# Patient Record
Sex: FEMALE | Race: WHITE | NOT HISPANIC OR LATINO
[De-identification: names, ages, dates, MRNs, and addresses within clinical notes are randomized per-mention and may not be internally consistent; named-entity substitution may affect disease eponyms.]

---

## 2019-01-08 ENCOUNTER — APPOINTMENT (OUTPATIENT)
Dept: NEPHROLOGY | Facility: CLINIC | Age: 69
End: 2019-01-08

## 2019-02-11 ENCOUNTER — LABORATORY RESULT (OUTPATIENT)
Age: 69
End: 2019-02-11

## 2019-02-11 ENCOUNTER — APPOINTMENT (OUTPATIENT)
Dept: NEPHROLOGY | Facility: CLINIC | Age: 69
End: 2019-02-11
Payer: MEDICARE

## 2019-02-11 VITALS
SYSTOLIC BLOOD PRESSURE: 142 MMHG | WEIGHT: 128 LBS | HEIGHT: 67 IN | HEART RATE: 81 BPM | DIASTOLIC BLOOD PRESSURE: 91 MMHG | OXYGEN SATURATION: 97 % | BODY MASS INDEX: 20.09 KG/M2

## 2019-02-11 VITALS — SYSTOLIC BLOOD PRESSURE: 128 MMHG | DIASTOLIC BLOOD PRESSURE: 78 MMHG

## 2019-02-11 DIAGNOSIS — Z87.891 PERSONAL HISTORY OF NICOTINE DEPENDENCE: ICD-10-CM

## 2019-02-11 DIAGNOSIS — Z78.9 OTHER SPECIFIED HEALTH STATUS: ICD-10-CM

## 2019-02-11 DIAGNOSIS — Z86.73 PERSONAL HISTORY OF TRANSIENT ISCHEMIC ATTACK (TIA), AND CEREBRAL INFARCTION W/OUT RESIDUAL DEFICITS: ICD-10-CM

## 2019-02-11 DIAGNOSIS — Z82.49 FAMILY HISTORY OF ISCHEMIC HEART DISEASE AND OTHER DISEASES OF THE CIRCULATORY SYSTEM: ICD-10-CM
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
## 2019-02-11 DIAGNOSIS — Z80.1 FAMILY HISTORY OF MALIGNANT NEOPLASM OF TRACHEA, BRONCHUS AND LUNG: ICD-10-CM

## 2019-02-11 DIAGNOSIS — Z86.59 PERSONAL HISTORY OF OTHER MENTAL AND BEHAVIORAL DISORDERS: ICD-10-CM

## 2019-02-11 PROCEDURE — 99205 OFFICE O/P NEW HI 60 MIN: CPT

## 2019-02-12 LAB
ALBUMIN SERPL ELPH-MCNC: 4 G/DL
ALP BLD-CCNC: 47 U/L
ALT SERPL-CCNC: 10 U/L
ANION GAP SERPL CALC-SCNC: 11 MMOL/L
APPEARANCE: CLEAR
AST SERPL-CCNC: 12 U/L
BACTERIA: NEGATIVE
BASOPHILS # BLD AUTO: 0.03 K/UL
BASOPHILS NFR BLD AUTO: 0.3 %
BILIRUB SERPL-MCNC: 0.2 MG/DL
BILIRUBIN URINE: NEGATIVE
BLOOD URINE: ABNORMAL
BUN SERPL-MCNC: 35 MG/DL
CALCIUM SERPL-MCNC: 9.4 MG/DL
CHLORIDE SERPL-SCNC: 102 MMOL/L
CO2 SERPL-SCNC: 23 MMOL/L
COLOR: YELLOW
CREAT SERPL-MCNC: 1.73 MG/DL
CREAT SPEC-SCNC: 37 MG/DL
CREAT/PROT UR: 0.4 RATIO
EOSINOPHIL # BLD AUTO: 0.18 K/UL
EOSINOPHIL NFR BLD AUTO: 1.8 %
GLUCOSE QUALITATIVE U: NEGATIVE MG/DL
GLUCOSE SERPL-MCNC: 95 MG/DL
HBA1C MFR BLD HPLC: 5.4 %
HCT VFR BLD CALC: 41.9 %
HGB BLD-MCNC: 13.1 G/DL
HYALINE CASTS: 1 /LPF
IMM GRANULOCYTES NFR BLD AUTO: 0.2 %
KETONES URINE: NEGATIVE
LEUKOCYTE ESTERASE URINE: NEGATIVE
LYMPHOCYTES # BLD AUTO: 2.53 K/UL
LYMPHOCYTES NFR BLD AUTO: 25.4 %
MAN DIFF?: NORMAL
MCHC RBC-ENTMCNC: 29.7 PG
MCHC RBC-ENTMCNC: 31.3 GM/DL
MCV RBC AUTO: 95 FL
MICROSCOPIC-UA: NORMAL
MONOCYTES # BLD AUTO: 0.84 K/UL
MONOCYTES NFR BLD AUTO: 8.4 %
NEUTROPHILS # BLD AUTO: 6.38 K/UL
NEUTROPHILS NFR BLD AUTO: 63.9 %
NITRITE URINE: NEGATIVE
PH URINE: 5.5
PLATELET # BLD AUTO: 307 K/UL
POTASSIUM SERPL-SCNC: 5.2 MMOL/L
PROT SERPL-MCNC: 7 G/DL
PROT UR-MCNC: 16 MG/DL
PROTEIN URINE: NEGATIVE MG/DL
RBC # BLD: 4.41 M/UL
RBC # FLD: 12.8 %
RED BLOOD CELLS URINE: 8 /HPF
SODIUM SERPL-SCNC: 136 MMOL/L
SPECIFIC GRAVITY URINE: 1.01
SQUAMOUS EPITHELIAL CELLS: 1 /HPF
UROBILINOGEN URINE: NEGATIVE MG/DL
WBC # FLD AUTO: 9.98 K/UL
WHITE BLOOD CELLS URINE: 0 /HPF

## 2019-02-12 NOTE — CONSULT LETTER
[Dear  ___] : Dear  [unfilled], [Consult Letter:] : I had the pleasure of evaluating your patient, [unfilled]. [Consult Closing:] : Thank you very much for allowing me to participate in the care of this patient.  If you have any questions, please do not hesitate to contact me. [Sincerely,] : Sincerely, [FreeTextEntry1] : I have included my initial consult note for your review.  Although is likely that the patient chronic kidney disease is related to the history of severe eating disorder and the long history of NSAIDS intake, I have elected to check for laboratory abnormalities associated w/ glomerular disease.  I did not repeat her VIJAY, complement and ANCA as they were done recently and were negative.  IgA nephritis and membranous perhaps associated w/ antiphospholipid antibodies (the patient has history of at TIA) are possible but not likely.  If glomerular disease is suggested by the work up or nephrotic range proteinuria, I will discuss with Mrs. Montgomery the possibility of a renal biopsy.\par I will keep you informed of the progress on this patient's work up.\par Addendum: her work up shows minimal proteinuria and no evidence of glomerular disease.  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  I am concerned about the degree of microscopic hematuria.  As both analgesic associated nephropathy and smoking increase the risk of urothelial malignancy, I think the patient should see a urologist for cystoscopy and urine cytology.  I will contact your office shortly to discuss the urology referral with you.

## 2019-02-12 NOTE — HISTORY OF PRESENT ILLNESS
[FreeTextEntry1] : Mrs. Coughlin is a 67 yo woman referred for evaluation of proteinuria and a serum creatinine ot 1.49 reflecting advance stage III CKD.  Review of the available data shows a renal ultrasound demonstrating two kidneys of normal size w/ small cysts and fullness of the renal pelvis without evidence of obstruction.  No mention of increased cortical echogenicity.  Normal post voiding residual urine in the bladder.  Laboratory studies show a potassium of 5.9, a fasting glucose of 116, normal complement and negative VIJAY and ANCA (Myeloperox and Protein S3). Non nephrotic proteinuria is demonstrated by a microalbumin to creatinine of over 900. \par Mrs. Coughlin is referred by the PA in her PCP office (Dr. Mason) for evaluation of high creatinine, proteinuria and microscopic hematuria.  She states that since 2015 she has been aware of an elevated creatinine.  It was 1.3 then. In 2018 her creatinine has fluctuated from 1.49 and 1.7.  She has seen a nephrologist, Dr. Hill several months ago.  Changes were made in her diet and discontinuation of supplements including herbs and vitamins.  She also stopped using NSAIDS.  She has been using Advil for over 20 years because of chronic low back pain.  She was also on PPI for chronic reflux, and they were discontinued. She has seen a pain consultant and was given a card for medical marijuana.  She was also given a Botox injection for lower back spasms.  Of note, the patient states that when her bulimia/anorexia were active, her weight was down to 90 lbs and she was closed to be hospitalized. Her weight has been stable to 130 lbs for many years.  The patient has no family history of kidney disease.  Her previous nephrologist attributed the chronic kidney disease to her past history of eating disorder.  \par Recently the patient was told that her serum K was elevated.  She was given an outpatient dose of Kayexalate two days ago. She has history of "arthritis" which she is not sure if it is Rheumatoid or Degenerative.  She states that her arthritis is mostly in her spine w/ several herniated discs at the cervical and lumbar level.

## 2019-02-12 NOTE — PHYSICAL EXAM
[General Appearance - Alert] : alert [Sclera] : the sclera and conjunctiva were normal [Outer Ear] : the ears and nose were normal in appearance [Neck Cervical Mass (___cm)] : no neck mass was observed [Jugular Venous Distention Increased] : there was no jugular-venous distention [Thyroid Diffuse Enlargement] : the thyroid was not enlarged [Auscultation Breath Sounds / Voice Sounds] : lungs were clear to auscultation bilaterally [Heart Sounds] : normal S1 and S2 [Heart Sounds Gallop] : no gallops [Systolic grade ___/6] : A grade [unfilled]/6 systolic murmur was heard. [Full Pulse] : the pedal pulses are present [Edema] : there was no peripheral edema [Veins - Varicosity Changes] : there were no varicosital changes [] : no hepato-splenomegaly [Urinary Bladder Findings] : the bladder was normal on palpation [Cervical Lymph Nodes Enlarged Posterior Bilaterally] : posterior cervical [Cervical Lymph Nodes Enlarged Anterior Bilaterally] : anterior cervical [No CVA Tenderness] : no ~M costovertebral angle tenderness [Abnormal Walk] : normal gait [Musculoskeletal - Swelling] : no joint swelling seen [No Focal Deficits] : no focal deficits [Oriented To Time, Place, And Person] : oriented to person, place, and time [FreeTextEntry1] : Normal wrist range of motion, No features of rheumatoid arthritis.

## 2019-02-12 NOTE — ASSESSMENT
[FreeTextEntry1] : 1. CKD stage III with proteinuria and hematuria.  It is likely that her proteinuria is not in the nephrotic range.  Her sonogram is normal except for few small cysts.  No evidence of ADPKD.  Her VIJAY, complement and ANCA are negative.  Her CKD may be related to the previous history of severe eating disorder.  It is not clear at this point if this is related to chronic glomerular disease or chronic interstitial disease related to episodes of FLORA.  Work up will include SPEP, UPEP, PLA2R Ab, antiphospholipid antibodies (history of TIA), repeat labs, including electrolytes (history of hyperkalemia) and renal function.  I discussed w/ the patient the possibility of a diagnostic renal biopsy if there is strong suspicion of a glomerular disorder.  Ig A is a possibility. \par 2. Hyperkalemia.  This is consistent with Type IV RTA.  There is no obstruction and no evidence of SLE.  Will repeat serum K and have discussed w/ the patient a low potassium diet. \par 3. Hypertension: currently BP is within guidelines of <130/80.\par 4. Hematuria and proteinuria.  UPEP requested.  \par The patient was given an appointment in 3 weeks.  She will be notified of the results of the work up later this week.

## 2019-02-12 NOTE — REVIEW OF SYSTEMS
[Feeling Tired] : feeling tired [Diarrhea] : diarrhea [Arthralgias] : arthralgias [Joint Pain] : joint pain [As Noted in HPI] : as noted in HPI [Negative] : Integumentary [Fever] : no fever [Joint Swelling] : no joint swelling [FreeTextEntry5] : H [de-identified] : History of a TIA

## 2019-02-13 LAB
ALBUMIN MFR SERPL ELPH: 58 %
ALBUMIN SERPL-MCNC: 4.1 G/DL
ALBUMIN/GLOB SERPL: 1.4 RATIO
ALPHA1 GLOB MFR SERPL ELPH: 4.5 %
ALPHA1 GLOB SERPL ELPH-MCNC: 0.3 G/DL
ALPHA2 GLOB MFR SERPL ELPH: 11.8 %
ALPHA2 GLOB SERPL ELPH-MCNC: 0.8 G/DL
B-GLOBULIN MFR SERPL ELPH: 10.5 %
B-GLOBULIN SERPL ELPH-MCNC: 0.7 G/DL
GAMMA GLOB FLD ELPH-MCNC: 1.1 G/DL
GAMMA GLOB MFR SERPL ELPH: 15.2 %
INTERPRETATION SERPL IEP-IMP: NORMAL
PROT SERPL-MCNC: 7 G/DL
PROT SERPL-MCNC: 7 G/DL

## 2019-02-14 LAB — DEPRECATED CARDIOLIPIN IGA SER: <5 APL

## 2019-02-16 LAB
PHOSPHOLIPASE A2 RECEPTOR ELISA: <2 RU/ML
PHOSPHOLIPASE A2 RECEPTOR IFA: NEGATIVE

## 2019-03-04 ENCOUNTER — APPOINTMENT (OUTPATIENT)
Dept: NEPHROLOGY | Facility: CLINIC | Age: 69
End: 2019-03-04
Payer: MEDICARE

## 2019-03-04 VITALS
HEIGHT: 67 IN | OXYGEN SATURATION: 98 % | SYSTOLIC BLOOD PRESSURE: 150 MMHG | DIASTOLIC BLOOD PRESSURE: 90 MMHG | HEART RATE: 71 BPM | BODY MASS INDEX: 20.4 KG/M2 | WEIGHT: 130 LBS

## 2019-03-04 PROCEDURE — 99214 OFFICE O/P EST MOD 30 MIN: CPT

## 2019-03-05 ENCOUNTER — OTHER (OUTPATIENT)
Age: 69
End: 2019-03-05

## 2019-03-05 LAB
ALBUMIN SERPL ELPH-MCNC: 4.5 G/DL
ANION GAP SERPL CALC-SCNC: 12 MMOL/L
BUN SERPL-MCNC: 28 MG/DL
CALCIUM SERPL-MCNC: 9.5 MG/DL
CHLORIDE SERPL-SCNC: 101 MMOL/L
CO2 SERPL-SCNC: 23 MMOL/L
CREAT SERPL-MCNC: 1.53 MG/DL
GLUCOSE SERPL-MCNC: 83 MG/DL
PHOSPHATE SERPL-MCNC: 4.4 MG/DL
POTASSIUM SERPL-SCNC: 5.8 MMOL/L
SODIUM SERPL-SCNC: 136 MMOL/L

## 2019-03-05 NOTE — PHYSICAL EXAM
[General Appearance - Alert] : alert [Sclera] : the sclera and conjunctiva were normal [Outer Ear] : the ears and nose were normal in appearance [Neck Cervical Mass (___cm)] : no neck mass was observed [Jugular Venous Distention Increased] : there was no jugular-venous distention [Thyroid Diffuse Enlargement] : the thyroid was not enlarged [Auscultation Breath Sounds / Voice Sounds] : lungs were clear to auscultation bilaterally [Heart Sounds] : normal S1 and S2 [Heart Sounds Gallop] : no gallops [Systolic grade ___/6] : A grade [unfilled]/6 systolic murmur was heard. [Full Pulse] : the pedal pulses are present [Edema] : there was no peripheral edema [Veins - Varicosity Changes] : there were no varicosital changes [] : no hepato-splenomegaly [Urinary Bladder Findings] : the bladder was normal on palpation [Cervical Lymph Nodes Enlarged Posterior Bilaterally] : posterior cervical [Cervical Lymph Nodes Enlarged Anterior Bilaterally] : anterior cervical [No CVA Tenderness] : no ~M costovertebral angle tenderness [Abnormal Walk] : normal gait [Musculoskeletal - Swelling] : no joint swelling seen [FreeTextEntry1] : Normal wrist range of motion, No features of rheumatoid arthritis. [No Focal Deficits] : no focal deficits [Oriented To Time, Place, And Person] : oriented to person, place, and time

## 2019-03-05 NOTE — ASSESSMENT
[FreeTextEntry1] : 1. CKD stage 3A1 (eGFR between 45 and 60 and minimal albuminuria).  Likely related to Chronic Interstitial Nephritis from use of analgesics and history of anorexia/bulimia.\par 2. Hyperkalemia out of proportion of renal dysfunction.  Compatible with distal. tubular dysfuncrion causing a defect in potassium secretion. Will check her renal panel today.\par 3. Proteinuria: no significant albuminuria.  No evidence of glomerular disease.\par I have discussed with the patient and her sister that her clinical picture is compatible with chronic interstitial nephritis secondary to tubular injury from analgesics and bulimia.  There is no evidence of a systemic disease, paraprotein associated disease, membranous or obstruction.  We discussed that patient with stage 3A1 CKD that has been stable have a low rate of progression to ESKD.  We also discussed that a kidney biopsy may be of some diagnostic value but it will likely not change the treatment.  We also discussed that if her kidney function worsens in the next several months, a kidney biopsy may become necessary to uncover a progressive renal disease.  We finally discussed the approach to hyperkalemia.  The patient is following a low K diet.  If her repeat K is elevated she is a candidate for Velatassa or Lokelma therapy, starting with once a day dose.  The patient and her sister have an excellent understanding of the above (as demonstrated by the "teach back"). \par I will call the patient in the morning to discuss the next steps.

## 2019-03-05 NOTE — ADDENDUM
[FreeTextEntry1] : March 5th, 2019 Addendum.  Creatinine down to 1.56.  K is high at 5.8. The patient will come to the office today at 2:30 for discussion and initiation of Lokelma therapy once a day with Dr. Doty.

## 2019-03-05 NOTE — CONSULT LETTER
[Dear  ___] : Dear  [unfilled], [Consult Letter:] : I had the pleasure of evaluating your patient, [unfilled]. [Sincerely,] : Sincerely, [FreeTextEntry1] : I have included my note for your review. The clinical picture best fits the diagnosis of chronic interstitial nephritis from analgesics and the previous history of bulimia/anorexia.  Damage to the potassium secretory mechanism of the distal tubule is responsible for the patient's hyperkalemia. She will start Lokelma once a day (sodium zirconium cyclosilcate oral suspension) once a day and repeat a renal panel in two weeks.\par Her current kidney disease is stage 3A1 (eGFR between 60 and 45 and no or minimal albuminuria).  Progression of ESKD is unlikely for the next decade.  \par Mrs. Coughlin will have her creatinine and potassium monitored at 2 to 3 months interval. \par As mentioned in my previous note, she has significant microscopic hematuria. This is a chronic findings and the patient had negative cystoscopies in the past.  However, both smoking and analgesic use are associate w/ an increased risk of transitional cell neoplasia.  She is seeing a urologist, as arranged by your PA next week.\par Thank you again for referring Mrs. Coughlin and feel free to contact me with questions or comments.  [FreeTextEntry3] : Conor Fitzgerald MD

## 2019-03-05 NOTE — REVIEW OF SYSTEMS
[Feeling Tired] : feeling tired [Diarrhea] : diarrhea [Dysuria] : dysuria [Arthralgias] : arthralgias [Negative] : Psychiatric

## 2019-03-05 NOTE — HISTORY OF PRESENT ILLNESS
[FreeTextEntry1] : Mrs. Coughlin is returning for follow up and discussion of the work up for the cause of her CKD III A1. Since her last visit there has been no changes in her status. She has made an appointment to see a urologist for microscopic hematuria in the setting of an increased risk for TCC because of her history of smoking and analgesic use.

## 2019-05-20 ENCOUNTER — APPOINTMENT (OUTPATIENT)
Dept: NEPHROLOGY | Facility: CLINIC | Age: 69
End: 2019-05-20
Payer: MEDICARE

## 2019-05-20 VITALS
OXYGEN SATURATION: 99 % | HEIGHT: 67 IN | HEART RATE: 77 BPM | SYSTOLIC BLOOD PRESSURE: 176 MMHG | DIASTOLIC BLOOD PRESSURE: 106 MMHG | WEIGHT: 130 LBS | BODY MASS INDEX: 20.4 KG/M2

## 2019-05-20 VITALS — DIASTOLIC BLOOD PRESSURE: 92 MMHG | SYSTOLIC BLOOD PRESSURE: 150 MMHG

## 2019-05-20 PROCEDURE — 99214 OFFICE O/P EST MOD 30 MIN: CPT

## 2019-05-20 NOTE — HISTORY OF PRESENT ILLNESS
[FreeTextEntry1] : Mrs. Coughlin returns today for a follow up.  She has 13 packets of Lokelma left.  She is having trouble navigating the [place] prescription program as she is receiving contrasting informations.  She is taking 10 g of Lokelma aroung 3 PM over 2 hours after or before taking any other medications.\par Her major issue continues to be chronic low back pain. Of note, her BP was found to be elevated in her PCP office and the dose of Metoprolol was increased to 150 mg per day.

## 2019-05-20 NOTE — REVIEW OF SYSTEMS
[Feeling Tired] : feeling tired [Constipation] : constipation [Diarrhea] : diarrhea [As Noted in HPI] : as noted in HPI [Anxiety] : anxiety [Negative] : Neurological [FreeTextEntry7] : Alternating constipation and diarrhea [FreeTextEntry9] : Chronic low back pain

## 2019-05-20 NOTE — ASSESSMENT
[FreeTextEntry1] : Results of last week renal panel discussed w/ patient.  Potassium is stable at 5.3.  Creatinine unchanged at 1.64 and BUN 34. \par 1. Hypertension.  Blood pressure is not in target.  I have discussed with the patient the options of the three first line drugs.  ACE or ARB are relative contraindicated by the hyperkalemia.  Calcium channel blockers are an option.  However best option is a long acting thiazide diuretic as it will help serum potassium control.  The patient was started on 2.5 of Metolazone in AM.  The dose of Metoprolol was decreased to 100 mg per day as the patient is experiencing increased fatigue. \par 2 CKD advanced stage 3 with minimal proteinuria.  Likely chronic interstitial nephritis.  Serum creatinine unchanged (see above).\par 3. Hyperkalemia.  Cause is a decreased K secretion by the kidney in the setting of chronic interstitial nephritis.  Last K in range.  Pt is experiencing issue in obtaining Lokelma from [place].  3 boxes of Lokelma each containing 11 packets of 10g each given to patient.  \par Plan: repeat renal panel first week in July.  Pt to make an appointment end of July.

## 2019-07-22 ENCOUNTER — APPOINTMENT (OUTPATIENT)
Dept: NEPHROLOGY | Facility: CLINIC | Age: 69
End: 2019-07-22
Payer: MEDICARE

## 2019-07-22 VITALS — BODY MASS INDEX: 19.93 KG/M2 | OXYGEN SATURATION: 95 % | WEIGHT: 127 LBS | HEART RATE: 64 BPM | HEIGHT: 67 IN

## 2019-07-22 PROCEDURE — 99214 OFFICE O/P EST MOD 30 MIN: CPT

## 2019-07-24 VITALS — SYSTOLIC BLOOD PRESSURE: 122 MMHG | DIASTOLIC BLOOD PRESSURE: 78 MMHG

## 2019-07-24 NOTE — PHYSICAL EXAM
[General Appearance - Alert] : alert [Neck Cervical Mass (___cm)] : no neck mass was observed [Sclera] : the sclera and conjunctiva were normal [Outer Ear] : the ears and nose were normal in appearance [Thyroid Diffuse Enlargement] : the thyroid was not enlarged [Jugular Venous Distention Increased] : there was no jugular-venous distention [Auscultation Breath Sounds / Voice Sounds] : lungs were clear to auscultation bilaterally [Heart Sounds] : normal S1 and S2 [Heart Sounds Gallop] : no gallops [Full Pulse] : the pedal pulses are present [Systolic grade ___/6] : A grade [unfilled]/6 systolic murmur was heard. [Edema] : there was no peripheral edema [Veins - Varicosity Changes] : there were no varicosital changes [] : no hepato-splenomegaly [Cervical Lymph Nodes Enlarged Posterior Bilaterally] : posterior cervical [Urinary Bladder Findings] : the bladder was normal on palpation [No CVA Tenderness] : no ~M costovertebral angle tenderness [Cervical Lymph Nodes Enlarged Anterior Bilaterally] : anterior cervical [Abnormal Walk] : normal gait [Musculoskeletal - Swelling] : no joint swelling seen [No Focal Deficits] : no focal deficits [Oriented To Time, Place, And Person] : oriented to person, place, and time [FreeTextEntry1] : Normal wrist range of motion, No features of rheumatoid arthritis.

## 2019-07-24 NOTE — REVIEW OF SYSTEMS
[Feeling Tired] : feeling tired [Diarrhea] : diarrhea [As Noted in HPI] : as noted in HPI [Anxiety] : anxiety [Negative] : Neurological [FreeTextEntry7] : Alternating constipation and diarrhea [FreeTextEntry9] : Chronic low back pain

## 2019-07-24 NOTE — HISTORY OF PRESENT ILLNESS
[FreeTextEntry1] : Mrs. Coughlin returns today for a follow up together with  her sister. She still has not been able to enroll in [place] [facility] support program.  She has been given Lokelma supply by this office. Prior to the rencent supply she was taking half a packet a day, instead of a full packet.  The patient reports an exacerbation of her irritable bowel with episodes of diarrhea.  For the last week this has improved. Her major issue remains her chronic low back pain. She is currently searching for a new pain management health care provider.  Her current regimen is inadequate.  The patient had labs done last week at [facility].  She provided me w/ a copy of the results.  Her creatinine is stable and her potassium is 5.  She has had no side effects from starting Metolazone 2.5 mg per day.

## 2019-07-24 NOTE — ASSESSMENT
[FreeTextEntry1] : \par 1. Hypertension.  Blood pressure is now in target after the addition of Metolazone.  Will continue present regimen\par 2 CKD advanced stage 3 with minimal proteinuria.  Likely chronic interstitial nephritis.  Serum creatinine unchanged \par 3. Hyperkalemia.  Cause is a decreased K secretion by the kidney in the setting of chronic interstitial nephritis.  Last K in range. Will continue Lokelma at a dose of one packet one day alternating with half a packet every other day.\par Plan: repeat renal panel first week in  one month and follow up appointment in two months. \par During this visit we discussed the option of GI referral for preventive regimen of IBS.

## 2019-08-13 ENCOUNTER — MEDICATION RENEWAL (OUTPATIENT)
Age: 69
End: 2019-08-13

## 2019-09-09 ENCOUNTER — APPOINTMENT (OUTPATIENT)
Dept: NEPHROLOGY | Facility: CLINIC | Age: 69
End: 2019-09-09
Payer: MEDICARE

## 2019-09-09 ENCOUNTER — APPOINTMENT (OUTPATIENT)
Dept: NEPHROLOGY | Facility: CLINIC | Age: 69
End: 2019-09-09

## 2019-09-09 PROCEDURE — 99214 OFFICE O/P EST MOD 30 MIN: CPT

## 2019-09-09 RX ORDER — METOLAZONE 2.5 MG/1
2.5 TABLET ORAL DAILY
Qty: 90 | Refills: 3 | Status: DISCONTINUED | COMMUNITY
Start: 2019-05-20 | End: 2019-09-09

## 2019-09-10 VITALS
DIASTOLIC BLOOD PRESSURE: 74 MMHG | HEART RATE: 66 BPM | RESPIRATION RATE: 14 BRPM | BODY MASS INDEX: 19.93 KG/M2 | SYSTOLIC BLOOD PRESSURE: 132 MMHG | WEIGHT: 127 LBS | HEIGHT: 67 IN

## 2019-09-10 NOTE — ASSESSMENT
[FreeTextEntry1] : 1. Hyponatremia: most likely related to high water intake and impaired renal dilution related to renal insufficiency and thiazide diuretics.  The patient was advised to decrease water intake and discontinue metolazone.  She will be started on Torsemide 5 mg per day.\par 2. Hyperkalemia: potassium normal on half a packet of Lokelma per day.\par 3. Stage G3bA1 CKD.  Presumed to be related to a combination of past heavy use of analgesics and history of boulimia.  Renal function is stable. Will continue to monitor at 3 to 4 months intervals. \par 4. Chronic low back pain.  Pt to seek a second opinion about radiofrequency nerve ablation\par \par Plan: repeat labs in mid October (to check for resolution of hyponatremia).\par

## 2019-09-10 NOTE — HISTORY OF PRESENT ILLNESS
[FreeTextEntry1] : Mrs. Coughlin is her for follow up and for discussion of her most recent labs which showed a serum K of under 5 and a serum Na of 127. Her creatinine was basically unchanged.  The patient admits to a large intake of water estimated to be lose to 5 liters per day. \par Her major issue remains pain management.  She has been offered radiofrequency denervation.  She is seeking a second opinion.  Medical marijuana has not been effective.

## 2019-09-10 NOTE — PHYSICAL EXAM
[General Appearance - Alert] : alert [Outer Ear] : the ears and nose were normal in appearance [Sclera] : the sclera and conjunctiva were normal [Neck Cervical Mass (___cm)] : no neck mass was observed [Jugular Venous Distention Increased] : there was no jugular-venous distention [Auscultation Breath Sounds / Voice Sounds] : lungs were clear to auscultation bilaterally [Thyroid Diffuse Enlargement] : the thyroid was not enlarged [Heart Sounds] : normal S1 and S2 [Heart Sounds Gallop] : no gallops [Systolic grade ___/6] : A grade [unfilled]/6 systolic murmur was heard. [Full Pulse] : the pedal pulses are present [Veins - Varicosity Changes] : there were no varicosital changes [Edema] : there was no peripheral edema [] : no hepato-splenomegaly [Urinary Bladder Findings] : the bladder was normal on palpation [Cervical Lymph Nodes Enlarged Posterior Bilaterally] : posterior cervical [Cervical Lymph Nodes Enlarged Anterior Bilaterally] : anterior cervical [Musculoskeletal - Swelling] : no joint swelling seen [No CVA Tenderness] : no ~M costovertebral angle tenderness [Abnormal Walk] : normal gait [No Focal Deficits] : no focal deficits [Oriented To Time, Place, And Person] : oriented to person, place, and time [FreeTextEntry1] : Normal wrist range of motion, No features of rheumatoid arthritis.

## 2019-09-10 NOTE — CONSULT LETTER
[Dear  ___] : Dear  [unfilled], [Sincerely,] : Sincerely, [Courtesy Letter:] : I had the pleasure of seeing your patient, [unfilled], in my office today. [FreeTextEntry1] : A brief update regarding Mrs. Coughlin. She has developed hyponatremia likely related to a combination of high water intake and the thiazide diuretic (metolazone).  I switched her to 5 mg Torsemide and discussed a more appropriate water intake.  Her potassium is now normal on half a packet of Lokelma per day.all\par Thank you for allowing to follow this patient with you.\par  [FreeTextEntry3] : Conor Fitzgerald MD, FACP\par Senior Attending\par DIvision of Hypertension and Kidney Diseases\par [facility]

## 2019-10-09 ENCOUNTER — LABORATORY RESULT (OUTPATIENT)
Age: 69
End: 2019-10-09

## 2019-10-09 DIAGNOSIS — M25.50 PAIN IN UNSPECIFIED JOINT: ICD-10-CM

## 2019-10-11 LAB
ANA SER IF-ACNC: NEGATIVE
APTT IMM NP/PRE NP PPP: NORMAL SEC
APTT INV RATIO PPP: 29.1 SEC
B2 GLYCOPROT1 IGA SERPL IA-ACNC: <5 SAU
C3 SERPL-MCNC: 127 MG/DL
C4 SERPL-MCNC: 28 MG/DL
CARDIOLIPIN AB SER IA-ACNC: POSITIVE
CCP AB SER IA-ACNC: <8 UNITS
CK SERPL-CCNC: 39 U/L
CRP SERPL-MCNC: 0.21 MG/DL
DSDNA AB SER-ACNC: 32 IU/ML
ERYTHROCYTE [SEDIMENTATION RATE] IN BLOOD BY WESTERGREN METHOD: 33 MM/HR
NPP NORMAL POOLED PLASMA: NORMAL SECS
RF+CCP IGG SER-IMP: NEGATIVE
RHEUMATOID FACT SER QL: <10 IU/ML
TSH SERPL-ACNC: 0.94 UIU/ML

## 2019-10-16 ENCOUNTER — APPOINTMENT (OUTPATIENT)
Dept: RHEUMATOLOGY | Facility: CLINIC | Age: 69
End: 2019-10-16
Payer: MEDICARE

## 2019-10-16 VITALS
OXYGEN SATURATION: 96 % | TEMPERATURE: 98.1 F | HEART RATE: 69 BPM | DIASTOLIC BLOOD PRESSURE: 81 MMHG | SYSTOLIC BLOOD PRESSURE: 123 MMHG | WEIGHT: 130 LBS | BODY MASS INDEX: 20.36 KG/M2

## 2019-10-16 DIAGNOSIS — Z00.00 ENCOUNTER FOR GENERAL ADULT MEDICAL EXAMINATION W/OUT ABNORMAL FINDINGS: ICD-10-CM

## 2019-10-16 DIAGNOSIS — K21.9 GASTRO-ESOPHAGEAL REFLUX DISEASE W/OUT ESOPHAGITIS: ICD-10-CM

## 2019-10-16 PROCEDURE — 99205 OFFICE O/P NEW HI 60 MIN: CPT

## 2019-10-16 RX ORDER — VENLAFAXINE HYDROCHLORIDE 150 MG/1
150 CAPSULE, EXTENDED RELEASE ORAL DAILY
Qty: 30 | Refills: 0 | Status: DISCONTINUED | COMMUNITY
Start: 2019-02-11 | End: 2019-10-16

## 2019-10-17 ENCOUNTER — TRANSCRIPTION ENCOUNTER (OUTPATIENT)
Age: 69
End: 2019-10-17

## 2019-10-20 PROBLEM — K21.9 GASTROESOPHAGEAL REFLUX DISEASE: Status: ACTIVE | Noted: 2019-02-11
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
## 2019-10-21 NOTE — DATA REVIEWED
[FreeTextEntry1] : Labs: \par 10/19 nl CBC, CMP (, ESR 33/ CRP, C3/4, TSH, PTH, CK, SPEP, PTT w/ neg RF/ CCP, VIJAY and subserologies to include APS/ LAC\par \par Diagnostics: \par 8/19 CT chest Non calcified 0.89 cm nodule is identified in anterior RUL.. also tiny nodules and granulomas in RUL.. 3 m f/u recommended.. \par \par 8/19 MRI LS spine:  mild but progressive multilevel DJD w/ asymmetries  worse upon the following:  R L5 nv root at L4-5; L L4 nv root at L3-4 \par \par 7/19 MRI thoracic multilevel DJD w/ spondylosis of T spine w/out significant canal stenosis\par \par \par \par \par \par

## 2019-10-21 NOTE — PHYSICAL EXAM
[General Appearance - Alert] : alert [General Appearance - In No Acute Distress] : in no acute distress [Outer Ear] : the ears and nose were normal in appearance [Sclera] : the sclera and conjunctiva were normal [Nasal Cavity] : the nasal mucosa and septum were normal [Oropharynx] : the oropharynx was normal [Heart Rate And Rhythm] : heart rate was normal and rhythm regular [Heart Sounds] : normal S1 and S2 [Heart Sounds Gallop] : no gallops [Murmurs] : no murmurs [Heart Sounds Pericardial Friction Rub] : no pericardial rub [Full Pulse] : the pedal pulses are present [Edema] : there was no peripheral edema [Bowel Sounds] : normal bowel sounds [Abdomen Soft] : soft [Abdomen Tenderness] : non-tender [Abdomen Mass (___ Cm)] : no abdominal mass palpated [Cervical Lymph Nodes Enlarged Posterior Bilaterally] : posterior cervical [Cervical Lymph Nodes Enlarged Anterior Bilaterally] : anterior cervical [Supraclavicular Lymph Nodes Enlarged Bilaterally] : supraclavicular [Femoral Lymph Nodes Enlarged Bilaterally] : femoral [Axillary Lymph Nodes Enlarged Bilaterally] : axillary [Inguinal Lymph Nodes Enlarged Bilaterally] : inguinal [No CVA Tenderness] : no ~M costovertebral angle tenderness [No Spinal Tenderness] : no spinal tenderness [Abnormal Walk] : normal gait [Nail Clubbing] : no clubbing  or cyanosis of the fingernails [Musculoskeletal - Swelling] : no joint swelling seen [Motor Tone] : muscle strength and tone were normal [Skin Color & Pigmentation] : normal skin color and pigmentation [Skin Turgor] : normal skin turgor [] : no rash [Deep Tendon Reflexes (DTR)] : deep tendon reflexes were 2+ and symmetric [Sensation] : the sensory exam was normal to light touch and pinprick [No Focal Deficits] : no focal deficits [FreeTextEntry1] : PSD 33 initial , PHQ 26 severe depression/ HENRRY 21 severe anxiety...

## 2019-10-21 NOTE — REVIEW OF SYSTEMS
[Feeling Poorly] : feeling poorly [Feeling Tired] : feeling tired [As Noted in HPI] : as noted in HPI [Dry Eyes] : dryness of the eyes [Chest Pain] : chest pain [Dysuria] : dysuria [Incontinence] : incontinence [Pelvic Pain] : pelvic pain [Dysmenorrhea] : dysmenorrhea [Arthralgias] : arthralgias [Joint Pain] : joint pain [Joint Stiffness] : joint stiffness [Sleep Disturbances] : sleep disturbances [Anxiety] : anxiety [Depression] : depression [Negative] : Heme/Lymph [FreeTextEntry5] : mild intermittent CP- non radiating point tenderness

## 2019-10-21 NOTE — HISTORY OF PRESENT ILLNESS
[FreeTextEntry1] : (Initial HPI 10/15/19)\par 70 yo wf with long hx HTN, HLD chronic pain, can't remember not having pain and significant psychiatric history of severe anxiety and depression with OCD, hx anorexia severe in past with chronic renal insufficiency as complication and hx of hypomania... Sleep severely disturbed, rarely sleeps more than a few hours. Diffuse arthralgias/ myalgias with stiffness all day and severely limited function... spends much time in bed at this point - too uncomfortable to move.  "nothing makes it better" has tried and failed numerous therapies with no improvement. \par Not currently working with psych\par SH:  lives alone, just ending long term relationship, not working (nothing recent) \par supportive sister   \par Strong FH w/ mother and sister with chronic pain/ mother dx FM\par No overt previous or current joint inflammation and though always feels flulike, \par No actual fevers, chills, URI/ lower resp sx, no lymphadenopathy \par - Chronic fatigue: \par - Migraines/ TMJ\par - IBS\par - IC\par - Paresthesias/ RLS: \par Tx: \par - ETOH in past moderate ? recreational drugs use in past \par

## 2019-10-22 ENCOUNTER — TRANSCRIPTION ENCOUNTER (OUTPATIENT)
Age: 69
End: 2019-10-22

## 2019-11-01 ENCOUNTER — TRANSCRIPTION ENCOUNTER (OUTPATIENT)
Age: 69
End: 2019-11-01

## 2019-11-02 ENCOUNTER — TRANSCRIPTION ENCOUNTER (OUTPATIENT)
Age: 69
End: 2019-11-02

## 2019-11-04 ENCOUNTER — TRANSCRIPTION ENCOUNTER (OUTPATIENT)
Age: 69
End: 2019-11-04

## 2019-11-21 ENCOUNTER — APPOINTMENT (OUTPATIENT)
Dept: RHEUMATOLOGY | Facility: CLINIC | Age: 69
End: 2019-11-21
Payer: MEDICARE

## 2019-11-21 VITALS
HEIGHT: 67 IN | BODY MASS INDEX: 20.4 KG/M2 | OXYGEN SATURATION: 97 % | HEART RATE: 62 BPM | DIASTOLIC BLOOD PRESSURE: 70 MMHG | WEIGHT: 130 LBS | TEMPERATURE: 97.7 F | SYSTOLIC BLOOD PRESSURE: 130 MMHG

## 2019-11-21 PROCEDURE — 99214 OFFICE O/P EST MOD 30 MIN: CPT

## 2019-11-24 NOTE — PHYSICAL EXAM
[General Appearance - Alert] : alert [General Appearance - In No Acute Distress] : in no acute distress [Sclera] : the sclera and conjunctiva were normal [Outer Ear] : the ears and nose were normal in appearance [Nasal Cavity] : the nasal mucosa and septum were normal [Oropharynx] : the oropharynx was normal [Heart Rate And Rhythm] : heart rate was normal and rhythm regular [Heart Sounds] : normal S1 and S2 [Heart Sounds Gallop] : no gallops [Murmurs] : no murmurs [Heart Sounds Pericardial Friction Rub] : no pericardial rub [Full Pulse] : the pedal pulses are present [Edema] : there was no peripheral edema [No CVA Tenderness] : no ~M costovertebral angle tenderness [No Spinal Tenderness] : no spinal tenderness [Abnormal Walk] : normal gait [Nail Clubbing] : no clubbing  or cyanosis of the fingernails [Musculoskeletal - Swelling] : no joint swelling seen [Motor Tone] : muscle strength and tone were normal [Skin Color & Pigmentation] : normal skin color and pigmentation [Skin Turgor] : normal skin turgor [Deep Tendon Reflexes (DTR)] : deep tendon reflexes were 2+ and symmetric [Sensation] : the sensory exam was normal to light touch and pinprick [No Focal Deficits] : no focal deficits [] : no respiratory distress [Auscultation Breath Sounds / Voice Sounds] : lungs were clear to auscultation bilaterally [FreeTextEntry1] : (10/16/19) PSD 33 initial  , PHQ 26 severe depression/ HENRRY 21 severe anxiety...  ; greatly improved today, no SI/ SA

## 2019-11-24 NOTE — REVIEW OF SYSTEMS
[Dry Eyes] : dryness of the eyes [As Noted in HPI] : as noted in HPI [Chest Pain] : chest pain [Dysuria] : dysuria [Incontinence] : incontinence [Pelvic Pain] : pelvic pain [Dysmenorrhea] : dysmenorrhea [Arthralgias] : arthralgias [Joint Pain] : joint pain [Joint Stiffness] : joint stiffness [Sleep Disturbances] : sleep disturbances [Anxiety] : anxiety [Depression] : depression [Negative] : Heme/Lymph [Feeling Tired] : feeling tired [Feeling Poorly] : not feeling poorly [FreeTextEntry5] : mild intermittent CP- non radiating point tenderness  [FreeTextEntry2] : some improvement overall... 25-50% better.. still fatigued but more active...
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
## 2019-11-24 NOTE — DATA REVIEWED
[FreeTextEntry1] : Labs: \par 11/19 nl CBC, CMP except Cr 1.8 - stable from \par \par 10/19 nl CBC, CMP (, ESR 33/ CRP, C3/4, TSH, PTH, CK, SPEP, PTT w/ neg RF/ CCP, VIJAY and subserologies to include APS/ LAC\par \par Diagnostics: \par 8/19 CT chest Non calcified 0.89 cm nodule is identified in anterior RUL.. also tiny nodules and granulomas in RUL.. 3 m f/u recommended.. \par \par 8/19 MRI LS spine:  mild but progressive multilevel DJD w/ asymmetries  worse upon the following:  R L5 nv root at L4-5; L L4 nv root at L3-4 \par \par 7/19 MRI thoracic multilevel DJD w/ spondylosis of T spine w/out significant canal stenosis\par \par \par \par \par \par

## 2019-11-24 NOTE — HISTORY OF PRESENT ILLNESS
[FreeTextEntry1] : 11/21/19 \par - sleeping better with Flexeril at 10 mg... lower doses not effective,but too fatigued in daytime... though notes improvement in overall wellbeing \par - gabapentin 300 mg TIID \par - baseline steps 1500 / day but willing to consider PT now, moving w/ less fear... \par - restarted prozac 60 mg with improvement in mood...  \par \par 1) FM/ MDD/ HENRRY and PTSD\par __________________________________________________________________________\par \par \par (Initial HPI 10/15/19)\par 70 yo wf with long hx HTN, HLD chronic pain, can't remember not having pain and significant psychiatric history of severe anxiety and depression with OCD, hx anorexia severe in past with chronic renal insufficiency as complication and hx of hypomania... Sleep severely disturbed, rarely sleeps more than a few hours. Diffuse arthralgias/ myalgias with stiffness all day and severely limited function... spends much time in bed at this point - too uncomfortable to move.  "nothing makes it better" has tried and failed numerous therapies with no improvement. \par Not currently working with psych\par SH:  lives alone, just ending long term relationship, not working (nothing recent) \par supportive sister   \par Strong FH w/ mother and sister with chronic pain/ mother dx FM\par No overt previous or current joint inflammation and though always feels flulike, \par No actual fevers, chills, URI/ lower resp sx, no lymphadenopathy \par - Chronic fatigue: \par - Migraines/ TMJ\par - IBS\par - IC\par - Paresthesias/ RLS: \par Tx: \par - ETOH in past moderate ? recreational drugs use in past \par

## 2019-12-02 ENCOUNTER — RX RENEWAL (OUTPATIENT)
Age: 69
End: 2019-12-02

## 2019-12-24 ENCOUNTER — TRANSCRIPTION ENCOUNTER (OUTPATIENT)
Age: 69
End: 2019-12-24

## 2020-01-06 ENCOUNTER — APPOINTMENT (OUTPATIENT)
Dept: NEPHROLOGY | Facility: CLINIC | Age: 70
End: 2020-01-06
Payer: MEDICARE

## 2020-01-06 VITALS
OXYGEN SATURATION: 99 % | WEIGHT: 140 LBS | DIASTOLIC BLOOD PRESSURE: 104 MMHG | HEART RATE: 70 BPM | SYSTOLIC BLOOD PRESSURE: 160 MMHG | BODY MASS INDEX: 21.93 KG/M2

## 2020-01-06 PROCEDURE — 99214 OFFICE O/P EST MOD 30 MIN: CPT

## 2020-01-07 RX ORDER — TRAZODONE HYDROCHLORIDE 100 MG/1
100 TABLET ORAL AT BEDTIME
Qty: 60 | Refills: 0 | Status: DISCONTINUED | COMMUNITY
Start: 2019-02-11 | End: 2020-01-07

## 2020-01-07 NOTE — CONSULT LETTER
[Dear  ___] : Dear  [unfilled], [Courtesy Letter:] : I had the pleasure of seeing your patient, [unfilled], in my office today. [Sincerely,] : Sincerely, [FreeTextEntry1] : A brief update regarding Mrs. Coughlin. She has developed hyponatremia likely related to a combination of high water intake and the thiazide diuretic (metolazone).  I switched her to 5 mg Torsemide and discussed a more appropriate water intake.  Her potassium is now normal on half a packet of Lokelma per day.all\par Thank you for allowing to follow this patient with you.\par  [FreeTextEntry3] : Conor Fitzgerald MD, FACP\par Senior Attending\par DIvision of Hypertension and Kidney Diseases\par [facility]

## 2020-01-07 NOTE — ASSESSMENT
[FreeTextEntry1] : 1. Hyponatremia has resolved off thiazide and wid decreased water intake.\par 2. Hyperkalemia: K was 5.9.  Patient admits an increase in food intake.  Lokelma to be continued at one packet per day.\par 3. Stage G3bA1 CKD.  Presumed to be related to a combination of past heavy use of analgesics and history of boulimia.  Renal function is stable. \par 4. Chronic low back pain. Pain control better on the new regimen.\par 5. Hypertension: patient in pain while sitting on the examining table. Home blood pressure 120/80.  The patient will check her BP at home few times per week and report to me in 2 to 3 weeks.  She will bring her BP device with \par her at the next visit.\par \par Plan: repeat labs in February.  Follow up in 4 months.\par

## 2020-01-07 NOTE — HISTORY OF PRESENT ILLNESS
[FreeTextEntry1] : Follow up visit.  The patient is under the care of a Alyssia Oneal for pain management.  Her new regimen was noted and current medication list updated.  Her pain is better. Minimal side effects.  The patient had labs done at [facility] and the results will be discussed today.

## 2020-01-07 NOTE — REVIEW OF SYSTEMS
[Feeling Tired] : feeling tired [Constipation] : constipation [As Noted in HPI] : as noted in HPI [Negative] : Psychiatric

## 2020-01-07 NOTE — PHYSICAL EXAM
[General Appearance - Alert] : alert [Sclera] : the sclera and conjunctiva were normal [Outer Ear] : the ears and nose were normal in appearance [Neck Cervical Mass (___cm)] : no neck mass was observed [Jugular Venous Distention Increased] : there was no jugular-venous distention [Thyroid Diffuse Enlargement] : the thyroid was not enlarged [Auscultation Breath Sounds / Voice Sounds] : lungs were clear to auscultation bilaterally [Heart Sounds Gallop] : no gallops [Heart Sounds] : normal S1 and S2 [Full Pulse] : the pedal pulses are present [Systolic grade ___/6] : A grade [unfilled]/6 systolic murmur was heard. [Edema] : there was no peripheral edema [Veins - Varicosity Changes] : there were no varicosital changes [] : no hepato-splenomegaly [Cervical Lymph Nodes Enlarged Posterior Bilaterally] : posterior cervical [Urinary Bladder Findings] : the bladder was normal on palpation [Cervical Lymph Nodes Enlarged Anterior Bilaterally] : anterior cervical [No CVA Tenderness] : no ~M costovertebral angle tenderness [Musculoskeletal - Swelling] : no joint swelling seen [Abnormal Walk] : normal gait [No Focal Deficits] : no focal deficits [Oriented To Time, Place, And Person] : oriented to person, place, and time [FreeTextEntry1] : Normal wrist range of motion, No features of rheumatoid arthritis.

## 2020-01-13 ENCOUNTER — TRANSCRIPTION ENCOUNTER (OUTPATIENT)
Age: 70
End: 2020-01-13

## 2020-01-13 RX ORDER — GABAPENTIN 100 MG/1
100 CAPSULE ORAL
Qty: 90 | Refills: 2 | Status: DISCONTINUED | COMMUNITY
Start: 2019-10-16 | End: 2020-01-13

## 2020-01-13 RX ORDER — GABAPENTIN 300 MG/1
300 CAPSULE ORAL
Qty: 270 | Refills: 3 | Status: DISCONTINUED | COMMUNITY
Start: 2019-11-21 | End: 2020-01-13

## 2020-01-14 ENCOUNTER — TRANSCRIPTION ENCOUNTER (OUTPATIENT)
Age: 70
End: 2020-01-14

## 2020-02-06 ENCOUNTER — APPOINTMENT (OUTPATIENT)
Dept: RHEUMATOLOGY | Facility: CLINIC | Age: 70
End: 2020-02-06
Payer: MEDICARE

## 2020-02-06 VITALS
DIASTOLIC BLOOD PRESSURE: 70 MMHG | TEMPERATURE: 97.9 F | BODY MASS INDEX: 21.93 KG/M2 | HEART RATE: 84 BPM | SYSTOLIC BLOOD PRESSURE: 110 MMHG | OXYGEN SATURATION: 98 % | WEIGHT: 140 LBS

## 2020-02-06 PROCEDURE — 99214 OFFICE O/P EST MOD 30 MIN: CPT

## 2020-02-06 RX ORDER — BUPROPION HYDROCHLORIDE 75 MG/1
75 TABLET, FILM COATED ORAL
Qty: 60 | Refills: 2 | Status: DISCONTINUED | COMMUNITY
Start: 2019-11-21 | End: 2020-02-06

## 2020-02-09 NOTE — DATA REVIEWED
[FreeTextEntry1] : Labs: \par 1/20 K 5.9 w/ Cr 1.76 (on kayexalate), nl AST w/ ALT 34 \par \par 11/19 nl CBC, CMP except Cr 1.8 - stable from \par \par 10/19 nl CBC, CMP (, ESR 33/ CRP, C3/4, TSH, PTH, CK, SPEP, PTT w/ neg RF/ CCP, VIJAY and subserologies to include APS/ LAC\par \par Diagnostics: \par 8/19 CT chest Non calcified 0.89 cm nodule is identified in anterior RUL.. also tiny nodules and granulomas in RUL.. 3 m f/u recommended.. \par \par 8/19 MRI LS spine:  mild but progressive multilevel DJD w/ asymmetries  worse upon the following:  R L5 nv root at L4-5; L L4 nv root at L3-4 \par \par 7/19 MRI thoracic multilevel DJD w/ spondylosis of T spine w/out significant canal stenosis\par \par \par \par \par \par
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
## 2020-02-09 NOTE — PHYSICAL EXAM
[General Appearance - Alert] : alert [General Appearance - In No Acute Distress] : in no acute distress [Nasal Cavity] : the nasal mucosa and septum were normal [Sclera] : the sclera and conjunctiva were normal [Outer Ear] : the ears and nose were normal in appearance [Auscultation Breath Sounds / Voice Sounds] : lungs were clear to auscultation bilaterally [Oropharynx] : the oropharynx was normal [Heart Sounds] : normal S1 and S2 [Heart Sounds Gallop] : no gallops [Heart Rate And Rhythm] : heart rate was normal and rhythm regular [Murmurs] : no murmurs [Heart Sounds Pericardial Friction Rub] : no pericardial rub [Edema] : there was no peripheral edema [No CVA Tenderness] : no ~M costovertebral angle tenderness [No Spinal Tenderness] : no spinal tenderness [Abnormal Walk] : normal gait [Nail Clubbing] : no clubbing  or cyanosis of the fingernails [Motor Tone] : muscle strength and tone were normal [Musculoskeletal - Swelling] : no joint swelling seen [Skin Turgor] : normal skin turgor [] : no rash [Skin Color & Pigmentation] : normal skin color and pigmentation [Deep Tendon Reflexes (DTR)] : deep tendon reflexes were 2+ and symmetric [Sensation] : the sensory exam was normal to light touch and pinprick [No Focal Deficits] : no focal deficits [FreeTextEntry1] : (10/16/19) PSD 33 initial  , PHQ 26 severe depression/ HENRRY 21 severe anxiety...  ; greatly improved today, no SI/ SA

## 2020-02-09 NOTE — REVIEW OF SYSTEMS
[Dry Eyes] : dryness of the eyes [Feeling Tired] : feeling tired [Chest Pain] : chest pain [Incontinence] : incontinence [Pelvic Pain] : pelvic pain [Dysuria] : dysuria [Arthralgias] : arthralgias [Dysmenorrhea] : dysmenorrhea [Joint Pain] : joint pain [Sleep Disturbances] : sleep disturbances [Joint Stiffness] : joint stiffness [Anxiety] : anxiety [Depression] : depression [Negative] : Heme/Lymph [As Noted in HPI] : as noted in HPI [Feeling Poorly] : feeling poorly [FreeTextEntry8] : chronic [FreeTextEntry2] : some improvement overall... 25-50% better.. still fatigued but more active... and minimal activity  [FreeTextEntry9] : chronic no swelling, fell recently now w/ increase LBP - fear of moving  [FreeTextEntry5] : mild intermittent CP- non radiating point tenderness  [de-identified] : still not well controlled

## 2020-02-09 NOTE — HISTORY OF PRESENT ILLNESS
[FreeTextEntry1] : 2/6/20\par In past full 300 mg and 60 mg prozac.. w/ +_ response... \par - started wellbutrin 150  mg  \par - decreased prozac 40 mg some improvement but incomplete \par - gabapentin 600 mg controlling pain and sleeping better... but frustrated feels 12 lb wt gain and VERY upset by.  Also notes tremors\par - never actually started PT... too overwhelmed..anxiety about social situation.. \par - sleeping better with Flexeril at 10 mg... lower doses not effective,but too fatigued in daytime... though notes improvement in overall wellbeing \par - baseline steps 1500 / day but willing to consider PT now, moving w/ less fear... but still minimal activity \par \par 1) FM/ MDD/ HENRRY and PTSD w/ long hx anorexia\par 2) Osteopenia:  last Dexa 3/28/18 w/ most severe T score -1.8 at [facility] no previous tx, fractures\par Risk factor:  eating disorder and variety of medications over ys\par __________________________________________________________________________\par \par \par (Initial HPI 10/15/19)\par 70 yo wf with long hx HTN, HLD chronic pain, can't remember not having pain and significant psychiatric history of severe anxiety and depression with OCD, hx anorexia severe in past with chronic renal insufficiency as complication and hx of hypomania... Sleep severely disturbed, rarely sleeps more than a few hours. Diffuse arthralgias/ myalgias with stiffness all day and severely limited function... spends much time in bed at this point - too uncomfortable to move.  "nothing makes it better" has tried and failed numerous therapies with no improvement. \par Not currently working with psych\par SH:  lives alone, just ending long term relationship, not working (nothing recent) \par supportive sister   \par Strong FH w/ mother and sister with chronic pain/ mother dx FM\par No overt previous or current joint inflammation and though always feels flulike, \par No actual fevers, chills, URI/ lower resp sx, no lymphadenopathy \par - Chronic fatigue: \par - Migraines/ TMJ\par - IBS\par - IC\par - Paresthesias/ RLS: \par Tx: \par - ETOH in past moderate ? recreational drugs use in past \par

## 2020-02-27 ENCOUNTER — TRANSCRIPTION ENCOUNTER (OUTPATIENT)
Age: 70
End: 2020-02-27

## 2020-02-28 ENCOUNTER — TRANSCRIPTION ENCOUNTER (OUTPATIENT)
Age: 70
End: 2020-02-28

## 2020-03-02 ENCOUNTER — TRANSCRIPTION ENCOUNTER (OUTPATIENT)
Age: 70
End: 2020-03-02

## 2020-03-09 ENCOUNTER — APPOINTMENT (OUTPATIENT)
Dept: NEPHROLOGY | Facility: CLINIC | Age: 70
End: 2020-03-09
Payer: MEDICARE

## 2020-03-09 VITALS
OXYGEN SATURATION: 98 % | DIASTOLIC BLOOD PRESSURE: 84 MMHG | SYSTOLIC BLOOD PRESSURE: 129 MMHG | BODY MASS INDEX: 21.3 KG/M2 | WEIGHT: 136 LBS | HEART RATE: 76 BPM

## 2020-03-09 PROCEDURE — 99213 OFFICE O/P EST LOW 20 MIN: CPT

## 2020-03-10 ENCOUNTER — RX RENEWAL (OUTPATIENT)
Age: 70
End: 2020-03-10

## 2020-03-11 NOTE — HISTORY OF PRESENT ILLNESS
[FreeTextEntry1] : Mrs. Coughlin is seen today in follow up for chronic hyperkalemia and stage III CKD with hypertension. Her renal disease is likely secondary to the previous history of eating disorder causing dehydration and FLORA and analgesic use for chronic low back pain. Her most recent labs were reviewed with the patient.  Her serum K around 5.5 and stable, creatinine of 1.8 and serum sodium was normal.  Her medical regimen was also reviewed and confirmed.  The patient continues to struggle with chronic pain syndrome but she is now confident with Dr. Alyssia Oneal.

## 2020-03-11 NOTE — ASSESSMENT
[FreeTextEntry1] : 1. Hyperkalemia: K in the upper limit of normal and stable.  .  Lokelma to be continued at one packet per day.\par 3. Stage G3bA1 CKD.  Presumed to be related to a combination of pasty use of analgesics and history of boulimia.  Renal function is stable. \par 4. Chronic low back pain. Pain control better on the new regimen.\par 5. Hypertension: blood pressure is normal today.\par Follow up in 3 monthhs.

## 2020-03-11 NOTE — CONSULT LETTER
[Dear  ___] : Dear  [unfilled], [Courtesy Letter:] : I had the pleasure of seeing your patient, [unfilled], in my office today. [Sincerely,] : Sincerely, [FreeTextEntry1] : A brief update regarding Mrs. Coughlin. She has developed hyponatremia likely related to a combination of high water intake and the thiazide diuretic (metolazone).  I switched her to 5 mg Torsemide and discussed a more appropriate water intake.  Her potassium is now normal on half a packet of Lokelma per day.all\par Thank you for allowing to follow this patient with you.\par  [FreeTextEntry3] : Conor Fitzgerald MD, FACP\par Senior Attending\par DIvision of Hypertension and Kidney Diseases\par [facility]

## 2020-04-21 ENCOUNTER — APPOINTMENT (OUTPATIENT)
Dept: RHEUMATOLOGY | Facility: CLINIC | Age: 70
End: 2020-04-21
Payer: MEDICARE

## 2020-04-21 VITALS
SYSTOLIC BLOOD PRESSURE: 140 MMHG | OXYGEN SATURATION: 97 % | HEART RATE: 66 BPM | TEMPERATURE: 98.9 F | BODY MASS INDEX: 21.14 KG/M2 | WEIGHT: 135 LBS | DIASTOLIC BLOOD PRESSURE: 82 MMHG

## 2020-04-21 PROCEDURE — 99214 OFFICE O/P EST MOD 30 MIN: CPT

## 2020-04-21 RX ORDER — ACETAMINOPHEN 500 MG/1
500 TABLET ORAL
Qty: 60 | Refills: 0 | Status: DISCONTINUED | COMMUNITY
Start: 2019-02-11 | End: 2020-04-21

## 2020-04-21 RX ORDER — GABAPENTIN 600 MG/1
600 TABLET, COATED ORAL 3 TIMES DAILY
Qty: 540 | Refills: 1 | Status: DISCONTINUED | COMMUNITY
Start: 2020-01-13 | End: 2020-04-21

## 2020-04-21 RX ORDER — FAMOTIDINE 20 MG
20 TABLET ORAL DAILY
Qty: 30 | Refills: 0 | Status: DISCONTINUED | COMMUNITY
Start: 2019-02-11 | End: 2020-04-21

## 2020-04-21 RX ORDER — CYCLOBENZAPRINE HYDROCHLORIDE 5 MG/1
5 TABLET, FILM COATED ORAL
Qty: 60 | Refills: 2 | Status: DISCONTINUED | COMMUNITY
Start: 2019-10-16 | End: 2020-04-21
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
## 2020-04-22 NOTE — PHYSICAL EXAM
[General Appearance - Alert] : alert [General Appearance - In No Acute Distress] : in no acute distress [Sclera] : the sclera and conjunctiva were normal [Heart Rate And Rhythm] : heart rate was normal and rhythm regular [Auscultation Breath Sounds / Voice Sounds] : lungs were clear to auscultation bilaterally [Murmurs] : no murmurs [Heart Sounds] : normal S1 and S2 [Heart Sounds Pericardial Friction Rub] : no pericardial rub [Heart Sounds Gallop] : no gallops [No CVA Tenderness] : no ~M costovertebral angle tenderness [Edema] : there was no peripheral edema [Nail Clubbing] : no clubbing  or cyanosis of the fingernails [No Spinal Tenderness] : no spinal tenderness [Abnormal Walk] : normal gait [Motor Tone] : muscle strength and tone were normal [Musculoskeletal - Swelling] : no joint swelling seen [Skin Color & Pigmentation] : normal skin color and pigmentation [Skin Turgor] : normal skin turgor [Deep Tendon Reflexes (DTR)] : deep tendon reflexes were 2+ and symmetric [Sensation] : the sensory exam was normal to light touch and pinprick [No Focal Deficits] : no focal deficits [Neck Appearance] : the appearance of the neck was normal [] : the neck was supple [FreeTextEntry1] : (10/16/19) PSD 33 initial  , PHQ 26 severe depression/ HENRRY 21 severe anxiety...  ; greatly improved today, no SI/ SA

## 2020-04-22 NOTE — REVIEW OF SYSTEMS
[Feeling Poorly] : feeling poorly [Dry Eyes] : dryness of the eyes [Feeling Tired] : feeling tired [Chest Pain] : chest pain [Dysuria] : dysuria [Incontinence] : incontinence [Pelvic Pain] : pelvic pain [Arthralgias] : arthralgias [Dysmenorrhea] : dysmenorrhea [Joint Stiffness] : joint stiffness [As Noted in HPI] : as noted in HPI [Joint Pain] : joint pain [Depression] : depression [Anxiety] : anxiety [Sleep Disturbances] : sleep disturbances [Negative] : Heme/Lymph [FreeTextEntry5] : mild intermittent CP- non radiating point tenderness  [FreeTextEntry2] : some improvement overall... 25-50% better.. still fatigued but more active... though still fairly limited  [de-identified] : still not well controlled  [FreeTextEntry9] : chronic no swelling, fell recently now w/ increase LBP - fear of moving  [FreeTextEntry8] : chronic

## 2020-04-22 NOTE — HISTORY OF PRESENT ILLNESS
[FreeTextEntry1] : 4/21/20\par - LFTs improved.. 3/20 now nl \par - stopped gabapentin... initially titrated down 100 mg TID.. higher doses caused balance issues and jerking motioins...since then jerking/ balance issues resolved but only after tapering off completely.. but absolutely felt it did help the pain.  Was also very bothered by 12 lbs wt gain on gabapentin ....\par - Still minimal activity tolerance, rarely more than 4640-0819 steps/ day... only once got to 3000.  \par - marijuana not tolerated.. \par - can't afford to consider naltrexone..\par - Prozac 60 mg and wellbutrin 300 mg with some benefit..absolutely feels mood better\par - spoke with psych :  Dr Hicks... recommends avoiding benzos... dx w/ borderline personality disorder, too high risk for possible addiction. \par \par 1) FM/ MDD/ HENRRY and PTSD w/ long hx anorexia\par 2) Osteopenia:  last Dexa 3/28/18 w/ most severe T score -1.8 at [facility] no previous tx, fractures\par Risk factor:  eating disorder and variety of medications over ys\par __________________________________________________________________________\par \par \par (Initial HPI 10/15/19)\par 70 yo wf with long hx HTN, HLD chronic pain, can't remember not having pain and significant psychiatric history of severe anxiety and depression with OCD, hx anorexia severe in past with chronic renal insufficiency as complication and hx of hypomania... Sleep severely disturbed, rarely sleeps more than a few hours. Diffuse arthralgias/ myalgias with stiffness all day and severely limited function... spends much time in bed at this point - too uncomfortable to move.  "nothing makes it better" has tried and failed numerous therapies with no improvement. \par Not currently working with psych\par SH:  lives alone, just ending long term relationship, not working (nothing recent) \par supportive sister   \par Strong FH w/ mother and sister with chronic pain/ mother dx FM\par No overt previous or current joint inflammation and though always feels flulike, \par No actual fevers, chills, URI/ lower resp sx, no lymphadenopathy \par - Chronic fatigue: \par - Migraines/ TMJ\par - IBS\par - IC\par - Paresthesias/ RLS: \par Tx: \par - ETOH in past moderate ? recreational drugs use in past \par
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
## 2020-05-06 ENCOUNTER — TRANSCRIPTION ENCOUNTER (OUTPATIENT)
Age: 70
End: 2020-05-06

## 2020-05-08 ENCOUNTER — RX RENEWAL (OUTPATIENT)
Age: 70
End: 2020-05-08

## 2020-05-11 ENCOUNTER — RX RENEWAL (OUTPATIENT)
Age: 70
End: 2020-05-11

## 2020-05-11 ENCOUNTER — APPOINTMENT (OUTPATIENT)
Dept: NEPHROLOGY | Facility: CLINIC | Age: 70
End: 2020-05-11
Payer: MEDICARE

## 2020-05-11 VITALS — DIASTOLIC BLOOD PRESSURE: 76 MMHG | SYSTOLIC BLOOD PRESSURE: 134 MMHG

## 2020-05-11 VITALS — BODY MASS INDEX: 20.4 KG/M2 | OXYGEN SATURATION: 98 % | HEIGHT: 67 IN | HEART RATE: 57 BPM | WEIGHT: 130 LBS

## 2020-05-11 DIAGNOSIS — K59.04 CHRONIC IDIOPATHIC CONSTIPATION: ICD-10-CM

## 2020-05-11 PROCEDURE — 99214 OFFICE O/P EST MOD 30 MIN: CPT

## 2020-05-11 RX ORDER — FLUOXETINE HYDROCHLORIDE 20 MG/1
20 CAPSULE ORAL
Qty: 30 | Refills: 1 | Status: DISCONTINUED | COMMUNITY
Start: 2020-02-06 | End: 2020-05-11

## 2020-05-11 RX ORDER — CYCLOBENZAPRINE HYDROCHLORIDE 5 MG/1
5 TABLET, FILM COATED ORAL
Qty: 120 | Refills: 2 | Status: DISCONTINUED | COMMUNITY
Start: 2020-04-21 | End: 2020-05-11

## 2020-05-11 NOTE — REVIEW OF SYSTEMS
[Feeling Tired] : feeling tired [Constipation] : constipation [Depression] : depression [Anxiety] : anxiety [As Noted in HPI] : as noted in HPI [Negative] : Integumentary [FreeTextEntry7] : Patient states she moves her bowel once a week after taking 3 Dulcolax [FreeTextEntry9] : Farshad

## 2020-05-11 NOTE — HISTORY OF PRESENT ILLNESS
[FreeTextEntry1] : Since the last visit: the patient cannot afford for Lokelma as her co-pay is over 550 dollars per month.  She is working w/ the company to take advantage of a discount program but so far she has not been successful.  She has 10 packets left.  She is trying really hard to keep her dietary K intake to a minimum.  \par She needs to discuss alternative to Lokelma therapy.  \par From her chronic pain syndrome, she is tapering off Lyrica because of side effects and will try Cymbalta.\par Labs were done at [facility] last week and will be discussed at this visit.

## 2020-05-11 NOTE — ASSESSMENT
[FreeTextEntry1] : Results of labs: serum K is 4.7 on one packet a day of Lokelma and strict dietary regimen.  \par 1. CKD III. stable.  Likely chronic interstitial nephritis from bout of FLORA and past analgesic use. \par 2. Hyperkalemia related to decrease urinary and fecal potassium excretion.  Lokelma is working well but patient cannot afford the co-pay.  If [employer] does not supply the medication at a discount we have few options.\par Probably the best is to continue with dietary restriction, increase torsemide to twice per day (or switch to furosemide which is more kaliuretic) and increase the frequency of bowel movements from once a week to three time a week using a more routing bowel regimen. Kayexalate is relative contraindicated by the chronic constipation and the higher risk of colonic necrosis.  Florinef is contraindicated by the hypertension.  Patiromer is an issue as it interfere with the absorption of medications.   We discussed the above w/ patient who will attempt to move her bowel two or three times per week, continue potassium dietary restriction and increase torsemide to twice per day.  She will take Lokelma every other day to stretch the current supply to 20 days. \par 3. Chronic constipation. Discussed that normally 5 to 10 mEq of K are excreted in the stool.  Once a week bowel movements in this patient has a contributory role in the hyperkalemia.  See above.\par Will keep in touch with the patient.  If she runs out of Lokelma, will do labs two weeks after to monitor serum K. \par Follow up appointment in 3 months.

## 2020-05-11 NOTE — REASON FOR VISIT
[Follow-Up] : a follow-up visit [Family Member] : family member [FreeTextEntry1] : Follow up visit for hyperkalemia and CKD III

## 2020-05-11 NOTE — PHYSICAL EXAM
[General Appearance - Alert] : alert [Outer Ear] : the ears and nose were normal in appearance [Sclera] : the sclera and conjunctiva were normal [Jugular Venous Distention Increased] : there was no jugular-venous distention [Neck Cervical Mass (___cm)] : no neck mass was observed [Thyroid Diffuse Enlargement] : the thyroid was not enlarged [Auscultation Breath Sounds / Voice Sounds] : lungs were clear to auscultation bilaterally [Heart Sounds] : normal S1 and S2 [Systolic grade ___/6] : A grade [unfilled]/6 systolic murmur was heard. [Heart Sounds Gallop] : no gallops [Edema] : there was no peripheral edema [Full Pulse] : the pedal pulses are present [Veins - Varicosity Changes] : there were no varicosital changes [] : no hepato-splenomegaly [Cervical Lymph Nodes Enlarged Posterior Bilaterally] : posterior cervical [Urinary Bladder Findings] : the bladder was normal on palpation [No CVA Tenderness] : no ~M costovertebral angle tenderness [Cervical Lymph Nodes Enlarged Anterior Bilaterally] : anterior cervical [FreeTextEntry1] : Normal wrist range of motion, No features of rheumatoid arthritis. [Musculoskeletal - Swelling] : no joint swelling seen [Abnormal Walk] : normal gait [No Focal Deficits] : no focal deficits [Oriented To Time, Place, And Person] : oriented to person, place, and time

## 2020-06-01 ENCOUNTER — RX RENEWAL (OUTPATIENT)
Age: 70
End: 2020-06-01

## 2020-06-23 ENCOUNTER — APPOINTMENT (OUTPATIENT)
Dept: RHEUMATOLOGY | Facility: CLINIC | Age: 70
End: 2020-06-23
Payer: MEDICARE

## 2020-06-23 PROCEDURE — 99214 OFFICE O/P EST MOD 30 MIN: CPT | Mod: 95

## 2020-06-23 RX ORDER — FLUOXETINE HYDROCHLORIDE 40 MG/1
40 CAPSULE ORAL
Qty: 90 | Refills: 3 | Status: DISCONTINUED | COMMUNITY
Start: 2019-11-21 | End: 2020-06-23

## 2020-06-23 RX ORDER — TRAZODONE HYDROCHLORIDE 100 MG/1
100 TABLET ORAL
Qty: 60 | Refills: 0 | Status: ACTIVE | COMMUNITY
Start: 2020-05-11

## 2020-06-23 RX ORDER — BUPROPION HYDROCHLORIDE 300 MG/1
300 TABLET, EXTENDED RELEASE ORAL DAILY
Qty: 90 | Refills: 0 | Status: DISCONTINUED | COMMUNITY
Start: 2020-02-06 | End: 2020-06-23

## 2020-06-23 RX ORDER — PREGABALIN 25 MG/1
25 CAPSULE ORAL
Qty: 90 | Refills: 2 | Status: DISCONTINUED | COMMUNITY
Start: 2020-04-21 | End: 2020-06-23

## 2020-06-23 NOTE — REVIEW OF SYSTEMS
[Feeling Poorly] : feeling poorly [Feeling Tired] : feeling tired [Dry Eyes] : dryness of the eyes [Chest Pain] : no chest pain [Incontinence] : incontinence [Dysuria] : dysuria [Dysmenorrhea] : dysmenorrhea [Pelvic Pain] : pelvic pain [Arthralgias] : arthralgias [Joint Stiffness] : joint stiffness [Joint Pain] : joint pain [As Noted in HPI] : as noted in HPI [Anxiety] : anxiety [Depression] : depression [Sleep Disturbances] : sleep disturbances [FreeTextEntry2] : not doing great since last visit.. . still fatigued but more active... though still fairly limited nothing more than 1500 steps / day. Did get new dog, twice daily walks   [Negative] : Heme/Lymph [FreeTextEntry9] : chronic no swelling, fell recently now w/ increase LBP - fear of moving  [FreeTextEntry8] : chronic [de-identified] : still not well controlled

## 2020-06-23 NOTE — CONSULT LETTER
[Dear  ___] : Dear  [unfilled], [Sincerely,] : Sincerely, [FreeTextEntry2] : Dr Luisa Hicks [phone] [Please see my note below.] : Please see my note below. [FreeTextEntry1] : \par \par  [FreeTextEntry3] : Alyssia Oneal DNP, ANP-C\par Division or Rheumatology\par [facility]\par \par

## 2020-06-23 NOTE — HISTORY OF PRESENT ILLNESS
[Medical Office: ([facility])___] : at the medical office located in  [Home] : at home, [unfilled] , at the time of the visit. [FreeTextEntry1] : Verbal consent given on 06/23/2020 at 14:46 by GARCIA MARTINEZ, [].\par [employer] video\par \par 6/23/20\par - trial of lyrica but experienced balance issues.. couldn't continue  \par - stopped prozac (on own- experienced SS) but notes return anger, mood not good... \par  and off wellbutrin... and started Cymbalta now now at 90 mg... overall not feeling well... can't say it's doing anything. \par - briefly retried gabapentin 100 mg with some benefit and better tolerated this time. \par - tramadol... 100 mg TID per PCP, only slight relief and concerned about this dose given other SSRI/ SNRIs.. \par Only 100 mg does not give any relief.  \par - update renal function stable at GFR 23.(5/20 }.. w/ nl LFTs 3/20 \par - marijuana not tolerated.. but wants to retry\par - can't afford to consider naltrexone..\par - Prozac 60 mg and wellbutrin 300 mg with some benefit..absolutely feels mood better\par - spoke with psych :  Dr Hicks... recommends avoiding benzos... dx w/ borderline personality disorder, too high risk for possible addiction. \par \par 1) FM/ MDD/ HENRRY and PTSD w/ long hx anorexia\par 2) Osteopenia:  last Dexa 3/28/18 w/ most severe T score -1.8 at [facility] no previous tx, fractures\par Risk factor:  eating disorder and variety of medications over ys\par __________________________________________________________________________\par \par \par (Initial HPI 10/15/19)\par 70 yo wf with long hx HTN, HLD chronic pain, can't remember not having pain and significant psychiatric history of severe anxiety and depression with OCD, hx anorexia severe in past with chronic renal insufficiency as complication and hx of hypomania... Sleep severely disturbed, rarely sleeps more than a few hours. Diffuse arthralgias/ myalgias with stiffness all day and severely limited function... spends much time in bed at this point - too uncomfortable to move.  "nothing makes it better" has tried and failed numerous therapies with no improvement. \par Not currently working with psych\par SH:  lives alone, just ending long term relationship, not working (nothing recent) \par supportive sister   \par Strong FH w/ mother and sister with chronic pain/ mother dx FM\par No overt previous or current joint inflammation and though always feels flulike, \par No actual fevers, chills, URI/ lower resp sx, no lymphadenopathy \par - Chronic fatigue: \par - Migraines/ TMJ\par - IBS\par - IC\par - Paresthesias/ RLS: \par Tx: \par - ETOH in past moderate ? recreational drugs use in past \par  [Verbal consent obtained from patient] : the patient, [unfilled]
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
## 2020-07-28 ENCOUNTER — APPOINTMENT (OUTPATIENT)
Dept: NEPHROLOGY | Facility: CLINIC | Age: 70
End: 2020-07-28
Payer: MEDICARE

## 2020-07-28 VITALS — HEART RATE: 77 BPM | OXYGEN SATURATION: 99 %

## 2020-07-28 PROCEDURE — 99213 OFFICE O/P EST LOW 20 MIN: CPT

## 2020-07-29 VITALS
DIASTOLIC BLOOD PRESSURE: 78 MMHG | HEART RATE: 76 BPM | OXYGEN SATURATION: 99 % | SYSTOLIC BLOOD PRESSURE: 132 MMHG | HEIGHT: 67 IN

## 2020-07-29 RX ORDER — DULOXETINE HYDROCHLORIDE 30 MG/1
30 CAPSULE, DELAYED RELEASE PELLETS ORAL
Qty: 90 | Refills: 0 | Status: DISCONTINUED | COMMUNITY
Start: 2020-06-19

## 2020-07-29 RX ORDER — LEVOFLOXACIN 250 MG/1
250 TABLET, FILM COATED ORAL
Qty: 10 | Refills: 0 | Status: DISCONTINUED | COMMUNITY
Start: 2020-02-12

## 2020-07-29 RX ORDER — DULOXETINE HYDROCHLORIDE 60 MG/1
60 CAPSULE, DELAYED RELEASE PELLETS ORAL
Qty: 180 | Refills: 0 | Status: DISCONTINUED | COMMUNITY
Start: 2020-07-14

## 2020-07-29 RX ORDER — TRAZODONE HYDROCHLORIDE 150 MG/1
150 TABLET ORAL
Qty: 90 | Refills: 0 | Status: DISCONTINUED | COMMUNITY
Start: 2020-02-24

## 2020-07-29 RX ORDER — CEFUROXIME AXETIL 500 MG/1
500 TABLET ORAL
Qty: 20 | Refills: 0 | Status: DISCONTINUED | COMMUNITY
Start: 2020-02-20

## 2020-07-29 NOTE — PHYSICAL EXAM
[General Appearance - Alert] : alert [Sclera] : the sclera and conjunctiva were normal [Neck Cervical Mass (___cm)] : no neck mass was observed [Outer Ear] : the ears and nose were normal in appearance [Jugular Venous Distention Increased] : there was no jugular-venous distention [Thyroid Diffuse Enlargement] : the thyroid was not enlarged [Auscultation Breath Sounds / Voice Sounds] : lungs were clear to auscultation bilaterally [Heart Sounds Gallop] : no gallops [Heart Sounds] : normal S1 and S2 [Systolic grade ___/6] : A grade [unfilled]/6 systolic murmur was heard. [Full Pulse] : the pedal pulses are present [Veins - Varicosity Changes] : there were no varicosital changes [Edema] : there was no peripheral edema [] : no hepato-splenomegaly [Urinary Bladder Findings] : the bladder was normal on palpation [Cervical Lymph Nodes Enlarged Posterior Bilaterally] : posterior cervical [Cervical Lymph Nodes Enlarged Anterior Bilaterally] : anterior cervical [No CVA Tenderness] : no ~M costovertebral angle tenderness [Abnormal Walk] : normal gait [Musculoskeletal - Swelling] : no joint swelling seen [No Focal Deficits] : no focal deficits [Oriented To Time, Place, And Person] : oriented to person, place, and time [FreeTextEntry1] : Mild diffuse non specific tenderness on palpation. No masses

## 2020-07-29 NOTE — REVIEW OF SYSTEMS
[Feeling Tired] : feeling tired [Constipation] : constipation [Anxiety] : anxiety [As Noted in HPI] : as noted in HPI [Depression] : depression [Negative] : Neurological [FreeTextEntry7] : Patient states she moves her bowel once a week after taking 3 Dulcolax [FreeTextEntry9] : Farshad [FreeTextEntry2] : Chronic pain syndrome

## 2020-07-29 NOTE — ASSESSMENT
[FreeTextEntry1] : \par 1. CKD III. stable.  Likely chronic interstitial nephritis from bout of FLORA and past analgesic use. \par 2. Hyperkalemia related to decrease urinary and fecal potassium excretion.  The current dose of Lokelma every other day coupled with her bowel regimen is controlling her serum potassium.\par 3. Hypertension: the patient's blood pressure is currently on target.\par Plan: the patient will have labs done every other month and see me every 4 months.

## 2020-07-29 NOTE — HISTORY OF PRESENT ILLNESS
[FreeTextEntry1] : Since the last visit the patient has changed her level of insurance and the monthly price for Lokelma (copayment) is now $ 350.  She has been taking 1 packet every other day and has been more stict with her diet.  She is having 3 bowel movements per week using laxatives.  Her most recent labs showed a creatinine of 2.1 (stable) and a serum K of 4.5. \par The patient's major issue is her severe fibromyalgia. She is under the care of Dr. Alyssia Amin.  She is currently on Cymbalta with minimal improvement in her symptoms.

## 2020-07-30 ENCOUNTER — RX RENEWAL (OUTPATIENT)
Age: 70
End: 2020-07-30

## 2020-09-22 DIAGNOSIS — Z87.440 PERSONAL HISTORY OF URINARY (TRACT) INFECTIONS: ICD-10-CM

## 2020-10-15 ENCOUNTER — RX RENEWAL (OUTPATIENT)
Age: 70
End: 2020-10-15

## 2020-10-24 ENCOUNTER — RX RENEWAL (OUTPATIENT)
Age: 70
End: 2020-10-24

## 2020-10-30 ENCOUNTER — RX RENEWAL (OUTPATIENT)
Age: 70
End: 2020-10-30

## 2020-12-23 PROBLEM — Z87.440 HISTORY OF URINARY TRACT INFECTION: Status: RESOLVED | Noted: 2019-02-11 | Resolved: 2020-12-23

## 2021-02-02 ENCOUNTER — APPOINTMENT (OUTPATIENT)
Dept: NEPHROLOGY | Facility: CLINIC | Age: 71
End: 2021-02-02

## 2021-02-26 ENCOUNTER — APPOINTMENT (OUTPATIENT)
Dept: NEPHROLOGY | Facility: CLINIC | Age: 71
End: 2021-02-26
Payer: MEDICARE

## 2021-02-26 VITALS
DIASTOLIC BLOOD PRESSURE: 73 MMHG | HEART RATE: 72 BPM | TEMPERATURE: 98.1 F | OXYGEN SATURATION: 98 % | SYSTOLIC BLOOD PRESSURE: 112 MMHG

## 2021-02-26 DIAGNOSIS — R00.2 PALPITATIONS: ICD-10-CM

## 2021-02-26 PROCEDURE — 99214 OFFICE O/P EST MOD 30 MIN: CPT

## 2021-02-26 RX ORDER — CIPROFLOXACIN HYDROCHLORIDE 250 MG/1
250 TABLET, FILM COATED ORAL
Qty: 14 | Refills: 0 | Status: DISCONTINUED | COMMUNITY
Start: 2020-09-22 | End: 2021-02-26

## 2021-02-26 RX ORDER — SIMVASTATIN 20 MG/1
20 TABLET, FILM COATED ORAL DAILY
Qty: 30 | Refills: 6 | Status: DISCONTINUED | COMMUNITY
Start: 2019-02-11 | End: 2021-02-26

## 2021-02-26 RX ORDER — CYCLOBENZAPRINE HYDROCHLORIDE 5 MG/1
5 TABLET, FILM COATED ORAL
Qty: 60 | Refills: 2 | Status: DISCONTINUED | COMMUNITY
Start: 2020-06-23 | End: 2021-02-26

## 2021-02-26 NOTE — REASON FOR VISIT
[Follow-Up] : a follow-up visit [Family Member] : family member [FreeTextEntry1] : CKDn stage IV w/ hypertension and mild proteinuria

## 2021-02-26 NOTE — REVIEW OF SYSTEMS
[Fever] : no fever [Chills] : no chills [Palpitations] : palpitations [Constipation] : constipation [As Noted in HPI] : as noted in HPI [Negative] : Psychiatric

## 2021-02-26 NOTE — HISTORY OF PRESENT ILLNESS
[FreeTextEntry1] : Follow up appointment.  Issues to be addressed today are the following.\par Hyperlipidemia with intolerance to statin therapy.  The patient reports worsening fibromyalgia symptoms on Lovastatin which improved off this medication.\par Current regimen to control fibromyalgia.\par Palpitations in the late afternoon.\par Need for a PCP in her area.\par Overall the patient is feeling better.  Her fibromyalgia is under reasonable control. She had to reduce Gabapentin from 600 to 300 mg per day because of the side effects of muscle spasm.  She is taking Loklema one packet eery other day.  Her last potassium was 5.1.

## 2021-02-26 NOTE — ASSESSMENT
[FreeTextEntry1] : We discussed the following issues. \par 1.Palpitations: the patient is taking Metoprolol 100 mg at night.  Her palpitations are usually in the last afternoon.  Will change Metoprolol to 50 mg twice a day.\par 2. Hyperkalemia: K controlled on Lokelma every other day.  The patient's drug plan copay is high for this medication.  The patient will lower the dose to every third day two weeks before next labs.  If K is less than 5.3 she can continue on the lower dose. \par 3. CKD stage IV. Stable.  Labs in 3 monhths.\par 4. Hypertension: current blood pressure well controlled.\par 5. Hyperlipidemia wit intolerance to statins.  Praluent 150 mg every two weeks ordered.  Likely will need pre-approval.\par Return in 3 months.\par Will also research a PCP in her area affiliated w/ [facility].

## 2021-08-05 ENCOUNTER — RX RENEWAL (OUTPATIENT)
Age: 71
End: 2021-08-05

## 2021-08-26 ENCOUNTER — LABORATORY RESULT (OUTPATIENT)
Age: 71
End: 2021-08-26

## 2021-08-26 ENCOUNTER — APPOINTMENT (OUTPATIENT)
Dept: RHEUMATOLOGY | Facility: CLINIC | Age: 71
End: 2021-08-26
Payer: MEDICARE

## 2021-08-26 VITALS
SYSTOLIC BLOOD PRESSURE: 116 MMHG | WEIGHT: 135 LBS | HEART RATE: 66 BPM | BODY MASS INDEX: 21.14 KG/M2 | OXYGEN SATURATION: 96 % | DIASTOLIC BLOOD PRESSURE: 68 MMHG | TEMPERATURE: 97.6 F

## 2021-08-26 DIAGNOSIS — M54.5 LOW BACK PAIN: ICD-10-CM

## 2021-08-26 DIAGNOSIS — G89.29 LOW BACK PAIN: ICD-10-CM

## 2021-08-26 PROCEDURE — 99214 OFFICE O/P EST MOD 30 MIN: CPT | Mod: 25

## 2021-08-26 PROCEDURE — 36415 COLL VENOUS BLD VENIPUNCTURE: CPT

## 2021-08-26 RX ORDER — TRAMADOL HYDROCHLORIDE 100 MG/1
100 TABLET, EXTENDED RELEASE ORAL
Qty: 30 | Refills: 0 | Status: DISCONTINUED | COMMUNITY
Start: 2020-06-23 | End: 2021-08-26

## 2021-08-26 RX ORDER — DULOXETINE HYDROCHLORIDE 60 MG/1
60 CAPSULE, DELAYED RELEASE ORAL
Refills: 0 | Status: DISCONTINUED | COMMUNITY
Start: 2021-02-26 | End: 2021-08-26

## 2021-08-29 PROBLEM — M54.5 CHRONIC BILATERAL LOW BACK PAIN WITHOUT SCIATICA: Status: ACTIVE | Noted: 2019-02-11
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
## 2021-08-29 NOTE — HISTORY OF PRESENT ILLNESS
[FreeTextEntry1] : 8/26/21\par - working with a new psychiatrist.. Dr Costa Yang (psychiatrist) q 3 w currently on Prozac 60 mg and Lamictal ....but absolutely bothered by high dose prozac, as in past experiences unsteady gait.. worse when taken with gabapentin which was helpful for pain.. Experience of SS in past on SS.. was also taking high doses of tramadol\par - stopped gabapentin.. caused balance issues.. but admits to increasing dose to high levels .. would like retry at lower dose... \par - functionally remains minimally active, never did use activity tracker, beyond dressing and basic ADLs... \par - tramadol once wkly 50 mg few times. wk \par - chronic renal insufficiency... \par - marijuana not tolerated.\par - can't afford to consider naltrexone..\par \par 1) FM/ MDD/ HENRRY and PTSD w/ long hx anorexia\par 2) Osteopenia:  last Dexa 3/28/18 w/ most severe T score -1.8 at [facility] no previous tx, fractures\par Risk factor:  eating disorder and variety of medications over ys\par __________________________________________________________________________\par \par \par (Initial HPI 10/15/19)\par 70 yo wf with long hx HTN, HLD chronic pain, can't remember not having pain and significant psychiatric history of severe anxiety and depression with OCD, hx anorexia severe in past with chronic renal insufficiency as complication and hx of hypomania... Sleep severely disturbed, rarely sleeps more than a few hours. Diffuse arthralgias/ myalgias with stiffness all day and severely limited function... spends much time in bed at this point - too uncomfortable to move.  "nothing makes it better" has tried and failed numerous therapies with no improvement. \par Not currently working with psych\par SH:  lives alone, just ending long term relationship, not working (nothing recent) \par supportive sister   \par Strong FH w/ mother and sister with chronic pain/ mother dx FM\par No overt previous or current joint inflammation and though always feels flulike, \par No actual fevers, chills, URI/ lower resp sx, no lymphadenopathy \par - Chronic fatigue: \par - Migraines/ TMJ\par - IBS\par - IC\par - Paresthesias/ RLS: \par Tx: \par - ETOH in past moderate ? recreational drugs use in past \par

## 2021-08-29 NOTE — PHYSICAL EXAM
[General Appearance - Alert] : alert [General Appearance - Well Nourished] : well nourished [General Appearance - Well Developed] : well developed [General Appearance - Well-Appearing] : healthy appearing [Sclera] : the sclera and conjunctiva were normal [PERRL With Normal Accommodation] : pupils were equal in size, round, and reactive to light [Extraocular Movements] : extraocular movements were intact [Neck Appearance] : the appearance of the neck was normal [Neck Cervical Mass (___cm)] : no neck mass was observed [Jugular Venous Distention Increased] : there was no jugular-venous distention [Thyroid Diffuse Enlargement] : the thyroid was not enlarged [Thyroid Nodule] : there were no palpable thyroid nodules [Auscultation Breath Sounds / Voice Sounds] : lungs were clear to auscultation bilaterally [Heart Rate And Rhythm] : heart rate was normal and rhythm regular [Heart Sounds] : normal S1 and S2 [Heart Sounds Gallop] : no gallops [Heart Sounds Pericardial Friction Rub] : no pericardial rub [Murmurs] : no murmurs [Edema] : there was no peripheral edema [Cervical Lymph Nodes Enlarged Posterior Bilaterally] : posterior cervical [Cervical Lymph Nodes Enlarged Anterior Bilaterally] : anterior cervical [Supraclavicular Lymph Nodes Enlarged Bilaterally] : supraclavicular [No CVA Tenderness] : no ~M costovertebral angle tenderness [No Spinal Tenderness] : no spinal tenderness [Abnormal Walk] : normal gait [Nail Clubbing] : no clubbing  or cyanosis of the fingernails [Musculoskeletal - Swelling] : no joint swelling seen [Motor Tone] : muscle strength and tone were normal [] : no rash [Sensation] : the sensory exam was normal to light touch and pinprick [No Focal Deficits] : no focal deficits [Oriented To Time, Place, And Person] : oriented to person, place, and time [Affect] : the affect was normal [FreeTextEntry1] : unchnaged, admits to depression, lack of motivation but little change over past few ys.  Denies SI/ SA

## 2021-08-29 NOTE — REVIEW OF SYSTEMS
[Feeling Poorly] : feeling poorly [Feeling Tired] : feeling tired [Dry Eyes] : dryness of the eyes [Dysuria] : dysuria [Incontinence] : incontinence [Pelvic Pain] : pelvic pain [Dysmenorrhea] : dysmenorrhea [Arthralgias] : arthralgias [Joint Pain] : joint pain [Joint Stiffness] : joint stiffness [Sleep Disturbances] : sleep disturbances [Anxiety] : anxiety [Depression] : depression [Negative] : Heme/Lymph [Chest Pain] : no chest pain [As Noted in HPI] : as noted in HPI [FreeTextEntry2] : Stable overall.... continues w/ chronic depression and widespread pain, little change over past few ys.. last seen > 12 m ago and that was TEB.    [FreeTextEntry8] : chronic- no change  [FreeTextEntry9] : chronic no swelling, fell recently now w/ increase LBP - fear of moving - better but unsteady gait [de-identified] : see above [de-identified] : still not well controlled

## 2021-09-01 LAB
25(OH)D3 SERPL-MCNC: 65.3 NG/ML
ALBUMIN SERPL ELPH-MCNC: 4.8 G/DL
ALP BLD-CCNC: 86 U/L
ALT SERPL-CCNC: 13 U/L
ANA SER IF-ACNC: NEGATIVE
ANION GAP SERPL CALC-SCNC: 18 MMOL/L
APPEARANCE: CLEAR
AST SERPL-CCNC: 16 U/L
BASOPHILS # BLD AUTO: 0.06 K/UL
BASOPHILS NFR BLD AUTO: 0.5 %
BILIRUB SERPL-MCNC: 0.4 MG/DL
BILIRUBIN URINE: NEGATIVE
BLOOD URINE: ABNORMAL
BUN SERPL-MCNC: 50 MG/DL
C3 SERPL-MCNC: 115 MG/DL
C4 SERPL-MCNC: 26 MG/DL
CALCIUM SERPL-MCNC: 9.9 MG/DL
CHLORIDE SERPL-SCNC: 95 MMOL/L
CK SERPL-CCNC: 52 U/L
CO2 SERPL-SCNC: 21 MMOL/L
COLOR: COLORLESS
CREAT SERPL-MCNC: 2.31 MG/DL
CREAT SPEC-SCNC: 26 MG/DL
CREAT/PROT UR: 0.4 RATIO
CRP SERPL-MCNC: <3 MG/L
DSDNA AB SER-ACNC: 32 IU/ML
ENA RNP AB SER IA-ACNC: 0.2 AL
ENA SM AB SER IA-ACNC: <0.2 AL
ENA SS-A AB SER IA-ACNC: <0.2 AL
ENA SS-B AB SER IA-ACNC: <0.2 AL
EOSINOPHIL # BLD AUTO: 0.22 K/UL
EOSINOPHIL NFR BLD AUTO: 2 %
ERYTHROCYTE [SEDIMENTATION RATE] IN BLOOD BY WESTERGREN METHOD: 28 MM/HR
GLUCOSE QUALITATIVE U: NEGATIVE
GLUCOSE SERPL-MCNC: 95 MG/DL
HCT VFR BLD CALC: 45.6 %
HGB BLD-MCNC: 13.9 G/DL
IMM GRANULOCYTES NFR BLD AUTO: 0.6 %
KETONES URINE: NEGATIVE
LEUKOCYTE ESTERASE URINE: NEGATIVE
LYMPHOCYTES # BLD AUTO: 2.02 K/UL
LYMPHOCYTES NFR BLD AUTO: 18.5 %
MAN DIFF?: NORMAL
MCHC RBC-ENTMCNC: 29.8 PG
MCHC RBC-ENTMCNC: 30.5 GM/DL
MCV RBC AUTO: 97.9 FL
MONOCYTES # BLD AUTO: 0.83 K/UL
MONOCYTES NFR BLD AUTO: 7.6 %
NEUTROPHILS # BLD AUTO: 7.71 K/UL
NEUTROPHILS NFR BLD AUTO: 70.8 %
NITRITE URINE: NEGATIVE
PH URINE: 5.5
PLATELET # BLD AUTO: 345 K/UL
POTASSIUM SERPL-SCNC: 5.1 MMOL/L
PROT SERPL-MCNC: 7.7 G/DL
PROT UR-MCNC: 10 MG/DL
PROTEIN URINE: NEGATIVE
RBC # BLD: 4.66 M/UL
RBC # FLD: 12.6 %
RHEUMATOID FACT SER QL: <10 IU/ML
SODIUM SERPL-SCNC: 134 MMOL/L
SPECIFIC GRAVITY URINE: 1.01
THYROGLOB AB SERPL-ACNC: <20 IU/ML
THYROPEROXIDASE AB SERPL IA-ACNC: 27.7 IU/ML
TSH SERPL-ACNC: 0.82 UIU/ML
UROBILINOGEN URINE: NORMAL
WBC # FLD AUTO: 10.91 K/UL

## 2021-09-02 ENCOUNTER — NON-APPOINTMENT (OUTPATIENT)
Age: 71
End: 2021-09-02

## 2021-09-02 LAB
ALBUMIN MFR SERPL ELPH: 59 %
ALBUMIN SERPL-MCNC: 4.5 G/DL
ALBUMIN/GLOB SERPL: 1.4 RATIO
ALPHA1 GLOB MFR SERPL ELPH: 4.1 %
ALPHA1 GLOB SERPL ELPH-MCNC: 0.3 G/DL
ALPHA2 GLOB MFR SERPL ELPH: 10.5 %
ALPHA2 GLOB SERPL ELPH-MCNC: 0.8 G/DL
B-GLOBULIN MFR SERPL ELPH: 10.8 %
B-GLOBULIN SERPL ELPH-MCNC: 0.8 G/DL
GAMMA GLOB FLD ELPH-MCNC: 1.2 G/DL
GAMMA GLOB MFR SERPL ELPH: 15.6 %
INTERPRETATION SERPL IEP-IMP: NORMAL
PROT SERPL-MCNC: 7.7 G/DL
PROT SERPL-MCNC: 7.7 G/DL

## 2021-09-15 LAB

## 2021-10-08 ENCOUNTER — APPOINTMENT (OUTPATIENT)
Dept: NEPHROLOGY | Facility: CLINIC | Age: 71
End: 2021-10-08

## 2021-10-13 ENCOUNTER — APPOINTMENT (OUTPATIENT)
Dept: NEPHROLOGY | Facility: CLINIC | Age: 71
End: 2021-10-13
Payer: MEDICARE

## 2021-10-13 VITALS
TEMPERATURE: 97.5 F | WEIGHT: 130 LBS | OXYGEN SATURATION: 95 % | HEIGHT: 67 IN | BODY MASS INDEX: 20.4 KG/M2 | SYSTOLIC BLOOD PRESSURE: 134 MMHG | HEART RATE: 70 BPM | DIASTOLIC BLOOD PRESSURE: 84 MMHG

## 2021-10-13 PROCEDURE — 99213 OFFICE O/P EST LOW 20 MIN: CPT

## 2021-10-13 RX ORDER — ALIROCUMAB 150 MG/ML
150 INJECTION, SOLUTION SUBCUTANEOUS
Qty: 1 | Refills: 6 | Status: DISCONTINUED | COMMUNITY
Start: 2021-02-26 | End: 2021-10-13

## 2021-10-13 RX ORDER — GABAPENTIN 100 MG/1
100 CAPSULE ORAL
Qty: 270 | Refills: 0 | Status: DISCONTINUED | COMMUNITY
Start: 2021-02-26 | End: 2021-10-13

## 2021-10-14 ENCOUNTER — RX RENEWAL (OUTPATIENT)
Age: 71
End: 2021-10-14

## 2021-10-14 LAB
ALBUMIN SERPL ELPH-MCNC: 4.4 G/DL
ALP BLD-CCNC: 88 U/L
ALT SERPL-CCNC: 16 U/L
ANION GAP SERPL CALC-SCNC: 16 MMOL/L
AST SERPL-CCNC: 13 U/L
BASOPHILS # BLD AUTO: 0.07 K/UL
BASOPHILS NFR BLD AUTO: 0.6 %
BILIRUB SERPL-MCNC: 0.2 MG/DL
BUN SERPL-MCNC: 31 MG/DL
CALCIUM SERPL-MCNC: 9.8 MG/DL
CHLORIDE SERPL-SCNC: 103 MMOL/L
CO2 SERPL-SCNC: 20 MMOL/L
CREAT SERPL-MCNC: 2.1 MG/DL
EOSINOPHIL # BLD AUTO: 0.3 K/UL
EOSINOPHIL NFR BLD AUTO: 2.6 %
GLUCOSE SERPL-MCNC: 105 MG/DL
HCT VFR BLD CALC: 44.3 %
HGB BLD-MCNC: 13.4 G/DL
IMM GRANULOCYTES NFR BLD AUTO: 0.9 %
LYMPHOCYTES # BLD AUTO: 2.58 K/UL
LYMPHOCYTES NFR BLD AUTO: 22.7 %
MAN DIFF?: NORMAL
MCHC RBC-ENTMCNC: 29.1 PG
MCHC RBC-ENTMCNC: 30.2 GM/DL
MCV RBC AUTO: 96.1 FL
MONOCYTES # BLD AUTO: 1.18 K/UL
MONOCYTES NFR BLD AUTO: 10.4 %
NEUTROPHILS # BLD AUTO: 7.13 K/UL
NEUTROPHILS NFR BLD AUTO: 62.8 %
PLATELET # BLD AUTO: 375 K/UL
POTASSIUM SERPL-SCNC: 5.6 MMOL/L
PROT SERPL-MCNC: 7 G/DL
RBC # BLD: 4.61 M/UL
RBC # FLD: 12.9 %
SODIUM SERPL-SCNC: 138 MMOL/L
WBC # FLD AUTO: 11.36 K/UL

## 2021-10-15 NOTE — REVIEW OF SYSTEMS
[Feeling Tired] : feeling tired [Constipation] : constipation [As Noted in HPI] : as noted in HPI [Arthralgias] : arthralgias [Joint Stiffness] : joint stiffness [Depression] : depression [Negative] : Neurological [Chest Pain] : no chest pain [Palpitations] : no palpitations [SOB on Exertion] : no shortness of breath during exertion [de-identified] : Osteopenia

## 2021-10-15 NOTE — HISTORY OF PRESENT ILLNESS
[FreeTextEntry1] : The patient is here accompanied by her sister.  She appears depressed. She states that she is breaking up with her boyfriend of many years.  She states that she feels this is necessary but emotionally she is very upset. \par Other issues discussed are related to the increase in serum creatinine at the time of her last blood test from 1.7 to 2.3.  She is also concern for the possibility of SLE as she has had an increased in anti DNA antibodies titers and she was told that she may have SLE.  She is also in the process of consulting w/ Dr. Michel to treatment of Osteopenia  Her current medications were reconciled during this visit. \par The other major issue is tremors. She has had a similar symptom when she was taking Prozac in the past. She will discuss this w/ her Psychiatrist in the near future.

## 2021-10-15 NOTE — REASON FOR VISIT
[Follow-Up] : a follow-up visit [Family Member] : family member [FreeTextEntry1] : Follow up visit for CKD

## 2021-10-15 NOTE — ASSESSMENT
[FreeTextEntry1] : 1. CKD progression from stage G3b/A1 to stage IV. Possibly related to progression of disease. Doubt that she has SLE but will ask Dr. Cooney to see her to exclude this possibility.  She has no proteinuria, her complement is normal and the anti DNA titers is minimally elevated. \par 2. Hyperkalemia. On Lokelma and Torsemide. Torsemide is not Kaliuretic. Will change to Bumex 2 mg per day and repeat labs in one month. \par 3. Hypertension: BP in range.\par 4 Fibromyalgia continue follow up w/ Tamara Oenal.\par 5 Osteopenia. Will request consultation w/ Dr. Michel, Endocrinology. \par Follow up labs in one month.

## 2021-10-19 ENCOUNTER — APPOINTMENT (OUTPATIENT)
Dept: ENDOCRINOLOGY | Facility: CLINIC | Age: 71
End: 2021-10-19
Payer: MEDICARE

## 2021-10-19 VITALS
HEIGHT: 67 IN | DIASTOLIC BLOOD PRESSURE: 88 MMHG | TEMPERATURE: 97.6 F | OXYGEN SATURATION: 99 % | HEART RATE: 82 BPM | SYSTOLIC BLOOD PRESSURE: 130 MMHG

## 2021-10-19 PROCEDURE — 99204 OFFICE O/P NEW MOD 45 MIN: CPT

## 2021-11-15 ENCOUNTER — APPOINTMENT (OUTPATIENT)
Dept: ENDOCRINOLOGY | Facility: CLINIC | Age: 71
End: 2021-11-15

## 2021-12-17 ENCOUNTER — NON-APPOINTMENT (OUTPATIENT)
Age: 71
End: 2021-12-17

## 2021-12-23 ENCOUNTER — APPOINTMENT (OUTPATIENT)
Dept: RHEUMATOLOGY | Facility: CLINIC | Age: 71
End: 2021-12-23

## 2022-01-19 ENCOUNTER — APPOINTMENT (OUTPATIENT)
Dept: NEUROLOGY | Facility: CLINIC | Age: 72
End: 2022-01-19
Payer: MEDICARE

## 2022-01-19 DIAGNOSIS — G31.84 MILD COGNITIVE IMPAIRMENT, SO STATED: ICD-10-CM

## 2022-01-19 PROCEDURE — 99449 NTRPROF PH1/NTRNET/EHR 31/>: CPT

## 2022-01-19 NOTE — CONSULT LETTER
[Dear  ___] : Dear  [unfilled], [Consult Letter:] : I had the pleasure of evaluating your patient, [unfilled]. [Please see my note below.] : Please see my note below. [Consult Closing:] : Thank you very much for allowing me to participate in the care of this patient.  If you have any questions, please do not hesitate to contact me. [Sincerely,] : Sincerely, [FreeTextEntry3] : Marcel Jeffries MD\par  [DrLeida  ___] : Dr. ALANIS [DrLeida ___] : Dr. ALANIS

## 2022-01-19 NOTE — REVIEW OF SYSTEMS
[Memory Lapses or Loss] : memory loss [Decr. Concentrating Ability] : decreased concentrating ability [Difficulty with Language] : ~M difficulty with language [Poor Coordination] : poor coordination [Difficulty Walking] : difficulty walking [Anxiety] : anxiety [Depression] : depression [Emotional Problems] : emotional problems [Negative] : Heme/Lymph

## 2022-01-19 NOTE — ASSESSMENT
[FreeTextEntry1] : Ms. Coughlin is a 71-year-old who suffers of hypertension, hyperlipidemia, advanced renal insufficiency, chronic constipation, osteopenia, anxiety, depression, obsessive-compulsive disorder and fibromyalgia.  She presents with longstanding tremors, myoclonic jerking, gait unsteadiness, memory and language difficulties.\par \par In view of her advanced kidney disease and polypharmacy, myoclonic jerking and tremors may be related to these processes.  This is supported by the fact that her symptoms worsened when taking high doses of renally excreted medications like gabapentin.  I am uncertain of the cause of her cognitive and language complaints.\par \par I suggested that she undergo an MRI of the brain without contrast, routine EEG and a comprehensive serologic evaluation.  Further management will depend upon those results and her clinical course.

## 2022-01-19 NOTE — REASON FOR VISIT
[Home] : at home, [unfilled] , at the time of the visit. [Medical Office: ([facility])___] : at the medical office located in  [Verbal consent obtained from patient] : the patient, [unfilled]

## 2022-01-19 NOTE — HISTORY OF PRESENT ILLNESS
[FreeTextEntry1] : Ms. Elana Coughlin is a 71-year-old left-handed patient who was referred for neurologic evaluation by Dr. Conor Fitzgerald, her nephrologist.\par \par She suffers from chronic kidney disease.  She has a lifelong history of anxiety, depression and obsessive-compulsive disorder.  She is under the care of a psychiatrist Dr. Costa Yang.\par \par She suffers from chronic body pain and has been diagnosed with fibromyalgia.  She is under the care of Alyssia Oneal NP.\par \par In 2015, she experienced a self-limited episode of inability to speak.  She was evaluated at [facility] and was prescribed aspirin for a possible TIA.  She has been noncompliant.  No other details are available.\par \par She has multiple other complaints.  She has a longstanding tremors and limb jerking.  These became aggravated when she took gabapentin 800 mg 3 times a day in early 2020. They ceased when she discontinued that medication.  She was restarted on gabapentin 300 mg 3 times a day in late 2020 with a recurrence of symptoms which prompted discontinuation of gabapentin.  She was subsequently treated with Lyrica and duloxetine without worsening of tremors.  Her fibromyalgia pain however was not improved.  She complains of imbalance for least 6 months.  She has had language and memory difficulties for over 2 years.  Her tremors and jerking increases with anxiety.  She denies head or vocal tremor.  She has chronic low back pain.\par \par Past surgical history is notable for breast augmentation, benign lumpectomy, BISI/BSO for cervical cancer and tonsillectomy.  She suffers from hypertension, hyperlipidemia, renal insufficiency, anorexia, bulimia, chronic constipation, benign thyroid disease, osteopenia, fibromyalgia, obsessive-compulsive disorder, anxiety and depression.  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  She is a former smoker and nondrinker.  Family history is notable for a mother with hypertension and hyperlipidemia.  Her sister suffers from breast cancer and hyperlipidemia.  Both parents suffered from anxiety, depression and obsessive-compulsive disorders.

## 2022-02-02 ENCOUNTER — LABORATORY RESULT (OUTPATIENT)
Age: 72
End: 2022-02-02

## 2022-02-02 ENCOUNTER — NON-APPOINTMENT (OUTPATIENT)
Age: 72
End: 2022-02-02

## 2022-02-02 LAB
CERULOPLASMIN SERPL-MCNC: 27 MG/DL
TSH SERPL-ACNC: 0.39 UIU/ML
VIT B12 SERPL-MCNC: 687 PG/ML

## 2022-02-03 LAB
CRP SERPL-MCNC: <3 MG/L
ERYTHROCYTE [SEDIMENTATION RATE] IN BLOOD BY WESTERGREN METHOD: 6 MM/HR
T PALLIDUM AB SER QL IA: NEGATIVE

## 2022-02-04 ENCOUNTER — APPOINTMENT (OUTPATIENT)
Dept: NEPHROLOGY | Facility: CLINIC | Age: 72
End: 2022-02-04

## 2022-02-04 ENCOUNTER — APPOINTMENT (OUTPATIENT)
Dept: ENDOCRINOLOGY | Facility: CLINIC | Age: 72
End: 2022-02-04

## 2022-02-04 LAB
COPPER SERPL-MCNC: 108 UG/DL
IGA 24H UR QL IFE: NORMAL

## 2022-02-05 LAB — METHYLMALONATE SERPL-SCNC: 428 NMOL/L

## 2022-02-07 ENCOUNTER — APPOINTMENT (OUTPATIENT)
Dept: ENDOCRINOLOGY | Facility: CLINIC | Age: 72
End: 2022-02-07
Payer: MEDICARE

## 2022-02-07 ENCOUNTER — APPOINTMENT (OUTPATIENT)
Dept: NEPHROLOGY | Facility: CLINIC | Age: 72
End: 2022-02-07
Payer: MEDICARE

## 2022-02-07 VITALS
TEMPERATURE: 97.7 F | OXYGEN SATURATION: 96 % | SYSTOLIC BLOOD PRESSURE: 144 MMHG | DIASTOLIC BLOOD PRESSURE: 78 MMHG | HEIGHT: 67 IN | HEART RATE: 72 BPM

## 2022-02-07 LAB — PCA AB SER QL IF: NORMAL

## 2022-02-07 PROCEDURE — 96372 THER/PROPH/DIAG INJ SC/IM: CPT

## 2022-02-07 PROCEDURE — 99213 OFFICE O/P EST LOW 20 MIN: CPT

## 2022-02-07 RX ORDER — DENOSUMAB 60 MG/ML
60 INJECTION SUBCUTANEOUS
Qty: 1 | Refills: 0 | Status: COMPLETED | OUTPATIENT
Start: 2022-02-07

## 2022-02-07 RX ADMIN — DENOSUMAB 0 MG/ML: 60 INJECTION SUBCUTANEOUS at 00:00

## 2022-02-08 VITALS — SYSTOLIC BLOOD PRESSURE: 128 MMHG | DIASTOLIC BLOOD PRESSURE: 74 MMHG

## 2022-02-08 LAB
25(OH)D3 SERPL-MCNC: 56.2 NG/ML
ALBUMIN MFR SERPL ELPH: 59.3 %
ALBUMIN SERPL ELPH-MCNC: 4.6 G/DL
ALBUMIN SERPL-MCNC: 4.2 G/DL
ALBUMIN/GLOB SERPL: 1.5 RATIO
ALP BLD-CCNC: 72 U/L
ALPHA1 GLOB MFR SERPL ELPH: 4.7 %
ALPHA1 GLOB SERPL ELPH-MCNC: 0.3 G/DL
ALPHA2 GLOB MFR SERPL ELPH: 11.1 %
ALPHA2 GLOB SERPL ELPH-MCNC: 0.8 G/DL
ALT SERPL-CCNC: 10 U/L
ANION GAP SERPL CALC-SCNC: 20 MMOL/L
APPEARANCE: CLEAR
AST SERPL-CCNC: 14 U/L
B-GLOBULIN MFR SERPL ELPH: 11.4 %
B-GLOBULIN SERPL ELPH-MCNC: 0.8 G/DL
BACTERIA: ABNORMAL
BASOPHILS # BLD AUTO: 0.06 K/UL
BASOPHILS NFR BLD AUTO: 0.6 %
BILIRUB SERPL-MCNC: 0.3 MG/DL
BILIRUBIN URINE: NEGATIVE
BLOOD URINE: ABNORMAL
BUN SERPL-MCNC: 30 MG/DL
CALCIUM SERPL-MCNC: 9.6 MG/DL
CHLORIDE SERPL-SCNC: 95 MMOL/L
CHOLEST SERPL-MCNC: 207 MG/DL
CO2 SERPL-SCNC: 23 MMOL/L
COLOR: NORMAL
CREAT SERPL-MCNC: 2.11 MG/DL
CREAT SPEC-SCNC: 35 MG/DL
CREAT/PROT UR: 0.2 RATIO
DEPRECATED KAPPA LC FREE/LAMBDA SER: 1.52 RATIO
EOSINOPHIL # BLD AUTO: 0.15 K/UL
EOSINOPHIL NFR BLD AUTO: 1.5 %
FMR1 GENE MUT ANL BLD/T: NORMAL
GAMMA GLOB FLD ELPH-MCNC: 0.9 G/DL
GAMMA GLOB MFR SERPL ELPH: 13.5 %
GLUCOSE QUALITATIVE U: NEGATIVE
GLUCOSE SERPL-MCNC: 95 MG/DL
HCT VFR BLD CALC: 44.7 %
HDLC SERPL-MCNC: 88 MG/DL
HGB BLD-MCNC: 13.9 G/DL
HYALINE CASTS: 1 /LPF
IGA SER QL IEP: 106 MG/DL
IGG SER QL IEP: 992 MG/DL
IGM SER QL IEP: 61 MG/DL
IMM GRANULOCYTES NFR BLD AUTO: 0.8 %
INTERPRETATION SERPL IEP-IMP: NORMAL
KAPPA LC CSF-MCNC: 2.87 MG/DL
KAPPA LC SERPL-MCNC: 4.36 MG/DL
KETONES URINE: NEGATIVE
LDLC SERPL CALC-MCNC: 99 MG/DL
LEUKOCYTE ESTERASE URINE: NEGATIVE
LYMPHOCYTES # BLD AUTO: 2.18 K/UL
LYMPHOCYTES NFR BLD AUTO: 21.1 %
M PROTEIN SPEC IFE-MCNC: NORMAL
MAN DIFF?: NORMAL
MCHC RBC-ENTMCNC: 30.8 PG
MCHC RBC-ENTMCNC: 31.1 GM/DL
MCV RBC AUTO: 99.1 FL
MICROSCOPIC-UA: NORMAL
MONOCYTES # BLD AUTO: 0.94 K/UL
MONOCYTES NFR BLD AUTO: 9.1 %
NEUTROPHILS # BLD AUTO: 6.91 K/UL
NEUTROPHILS NFR BLD AUTO: 66.9 %
NITRITE URINE: NEGATIVE
NONHDLC SERPL-MCNC: 119 MG/DL
PH URINE: 6
PLATELET # BLD AUTO: 271 K/UL
POTASSIUM SERPL-SCNC: 4.4 MMOL/L
PROT SERPL-MCNC: 6.9 G/DL
PROT SERPL-MCNC: 7 G/DL
PROT SERPL-MCNC: 7 G/DL
PROT UR-MCNC: 5 MG/DL
PROTEIN URINE: NEGATIVE
RBC # BLD: 4.51 M/UL
RBC # FLD: 13.1 %
RED BLOOD CELLS URINE: 1 /HPF
SODIUM SERPL-SCNC: 138 MMOL/L
SPECIFIC GRAVITY URINE: 1.01
SQUAMOUS EPITHELIAL CELLS: 3 /HPF
TRIGL SERPL-MCNC: 101 MG/DL
UROBILINOGEN URINE: NORMAL
VIT B12 SERPL-MCNC: 641 PG/ML
WBC # FLD AUTO: 10.32 K/UL
WHITE BLOOD CELLS URINE: 2 /HPF

## 2022-02-08 RX ORDER — ACETAMINOPHEN AND CODEINE 300; 30 MG/1; MG/1
300-30 TABLET ORAL
Qty: 8 | Refills: 0 | Status: DISCONTINUED | COMMUNITY
Start: 2021-08-12

## 2022-02-08 NOTE — REASON FOR VISIT
[Follow-Up] : a follow-up visit [Family Member] : family member [FreeTextEntry1] : Follow up visit for CKD stage IV, chronic pain syndrome.

## 2022-02-08 NOTE — ASSESSMENT
[FreeTextEntry1] : 1. CKD progression from stage G3b/A1 to stage IV. No clinical suspicion of SLE. Will check labs today. Chronic interstitial nephritis is usually associated w/ slow progression. \par 2. Hyperkalemia. On Lokelma and now Bumex. Will check K today.\par 3. Hyperlipidemia: will check lipids today\par Will call patient w/ her results in the morning. \par Return in 4 months.

## 2022-02-08 NOTE — HISTORY OF PRESENT ILLNESS
[FreeTextEntry1] : The patient is here accompanied by her sister.  Since the last visit she has seen Dr. Michel, Endocrinology for her osteoporosis and Dr. Jeffries, Neurology for her history of possible TIA, tremors and limb jerking. Neurological work up was unrevealing. The patient is currently undergoing epidural injection for lumbar radiculopathy.  She states she is no longer using Alyssia Stamatos for her chronic pain syndrome. \par She is followed by me for stage IV CKD, minimally progressive and tendency to hyperkalemia.  Her underlying renal disease is felt to be chronic interstitial nephritis from analgesics and bouts of FLORA from her previous history of bulimia .

## 2022-02-08 NOTE — REVIEW OF SYSTEMS
[Feeling Tired] : feeling tired [Constipation] : constipation [As Noted in HPI] : as noted in HPI [Arthralgias] : arthralgias [Joint Stiffness] : joint stiffness [Depression] : depression [Negative] : Neurological [Chest Pain] : no chest pain [Palpitations] : no palpitations [SOB on Exertion] : no shortness of breath during exertion [de-identified] : Osteopenia

## 2022-02-09 LAB
AMPA-R ABCBA: NEGATIVE
AMPHIPHYSIN IGG TITR SER IF: NEGATIVE TITER
CASPR2-IGG CBA, S: NEGATIVE
CV2 IGG TITR SER: NEGATIVE TITER
GABA-B ABCBA: NEGATIVE
GAD65 AB SER-MCNC: 0 NMOL/L
GLIAL NUC TYPE 1 AB TITR SER: NEGATIVE TITER
HU1 AB TITR SER: NEGATIVE TITER
HU2 AB TITR SER IF: NEGATIVE TITER
HU3 AB TITR SER: NEGATIVE TITER
IGLON5 IFA, S: NEGATIVE
IMMUNOLOGIST REVIEW: NORMAL
LGI1-IGG CBA, S: NEGATIVE
NIF IFA, S: NEGATIVE
NMDA-R ABCBA: NEGATIVE
PCA-1 AB TITR SER: NEGATIVE TITER
PCA-2 AB TITR SER: NEGATIVE TITER
PCA-TR AB TITR SER: NEGATIVE TITER
REFLEX ADDED: NORMAL

## 2022-02-10 ENCOUNTER — APPOINTMENT (OUTPATIENT)
Dept: MRI IMAGING | Facility: CLINIC | Age: 72
End: 2022-02-10
Payer: MEDICARE

## 2022-02-10 ENCOUNTER — TRANSCRIPTION ENCOUNTER (OUTPATIENT)
Age: 72
End: 2022-02-10

## 2022-02-10 ENCOUNTER — APPOINTMENT (OUTPATIENT)
Dept: RHEUMATOLOGY | Facility: CLINIC | Age: 72
End: 2022-02-10

## 2022-02-10 ENCOUNTER — OUTPATIENT (OUTPATIENT)
Dept: OUTPATIENT SERVICES | Facility: HOSPITAL | Age: 72
LOS: 1 days | End: 2022-02-10

## 2022-02-10 DIAGNOSIS — R25.1 TREMOR, UNSPECIFIED: ICD-10-CM

## 2022-02-10 PROCEDURE — 70551 MRI BRAIN STEM W/O DYE: CPT | Mod: 26,MG

## 2022-02-10 PROCEDURE — G1004: CPT

## 2022-02-11 ENCOUNTER — APPOINTMENT (OUTPATIENT)
Dept: NEUROLOGY | Facility: CLINIC | Age: 72
End: 2022-02-11
Payer: MEDICARE

## 2022-02-11 PROCEDURE — 93040 RHYTHM ECG WITH REPORT: CPT

## 2022-02-11 PROCEDURE — 95819 EEG AWAKE AND ASLEEP: CPT

## 2022-02-14 ENCOUNTER — TRANSCRIPTION ENCOUNTER (OUTPATIENT)
Age: 72
End: 2022-02-14

## 2022-02-15 ENCOUNTER — TRANSCRIPTION ENCOUNTER (OUTPATIENT)
Age: 72
End: 2022-02-15

## 2022-02-16 LAB — VIT B1 SERPL-MCNC: 137.9 NMOL/L

## 2022-03-24 ENCOUNTER — APPOINTMENT (OUTPATIENT)
Dept: NEUROLOGY | Facility: CLINIC | Age: 72
End: 2022-03-24
Payer: MEDICARE

## 2022-03-24 VITALS
BODY MASS INDEX: 20.4 KG/M2 | HEIGHT: 67 IN | SYSTOLIC BLOOD PRESSURE: 160 MMHG | HEART RATE: 63 BPM | DIASTOLIC BLOOD PRESSURE: 81 MMHG | WEIGHT: 130 LBS

## 2022-03-24 DIAGNOSIS — R27.0 ATAXIA, UNSPECIFIED: ICD-10-CM

## 2022-03-24 DIAGNOSIS — R25.1 TREMOR, UNSPECIFIED: ICD-10-CM

## 2022-03-24 DIAGNOSIS — G25.3 MYOCLONUS: ICD-10-CM

## 2022-03-24 LAB — COMBINED MITO GENOME PLUS MITO NUCLEAR GENE PANEL: ABNORMAL

## 2022-03-24 PROCEDURE — 99214 OFFICE O/P EST MOD 30 MIN: CPT

## 2022-03-24 NOTE — PHYSICAL EXAM
[FreeTextEntry1] : Constitutional:  Patient was well-developed, well-nourished and in no acute distress. \par \par Head:  Normocephalic, atraumatic. Tympanic membranes were clear. \par \par Neck:  Supple with full range of motion. \par \par Cardiovascular:  Cardiac rhythm was regular without murmur. There were no carotid bruits. Peripheral pulses were full and symmetric. \par \par Respiratory:  Lungs were clear. \par \par Abdomen:  Soft and nontender. \par \par Spine:  Nontender. \par \par Skin:  There were no rashes. \par \par NEUROLOGICAL EXAMINATION:\par \par Mental Status: Patient was alert and oriented. Speech was fluent. There was no dysarthria.  She scored a perfect 30 on MMSE.  Handwriting was normal.\par \par Cranial Nerves: \par \par II: Visual acuity was 20/25-2 in the right eye and 20/20-1 in left eye with glasses and near card. Pupils were equal and reactive. Visual fields were full. Funduscopic examination was normal. \par \par III, IV, VI:  Eye movements were full without nystagmus. \par \par V: Facial sensation was intact. \par \par VII: Facial strength was normal. \par \par VIII: Hearing was equal. \par \par IX, X: Palatal movement was normal. Phonation was normal. \par \par XI: Sternocleidomastoids and trapezii were normal. \par \par XII: Tongue was midline and movements normal. There was no lingual atrophy or fasciculations. \par \par Motor Examination: Muscle bulk, tone and strength were normal.  She had a mild postural tremor in her outstretched arms.  There was no rest tremor or myoclonic jerking.\par \par Sensory Examination: Pinprick, vibration and joint position sense were intact. \par \par Reflexes: DTRs were 2 throughout. \par \par Plantar Responses: Plantar responses were flexor. \par \par Coordination/Cerebellar Function: There was no dysmetria on finger to nose or heel to shin testing. \par \par Gait/Stance: Gait was normal. Romberg was negative.  Tandem was mildly unsteady.\par \par

## 2022-03-24 NOTE — ASSESSMENT
[FreeTextEntry1] : Ms. Coughlin is a 71-year-old who suffers of hypertension, hyperlipidemia, advanced renal insufficiency, chronic constipation, osteopenia, anxiety, depression, obsessive-compulsive disorder and fibromyalgia.  I suspect that her tremor and myoclonus is related to physiologic disruptions due to chronic renal disease and medications.  I cannot exclude an underlying essential tremor.  She does not appear parkinsonian.  She does not appear to be suffering from a progressive dementia.  She has psychiatric issues which likely explain at least some of her cognitive and affective complaints.  Her mildly elevated methylmalonic acid level in the setting of a normal vitamin B12 level might be attributed to renal insufficiency.  I looked at your direction regarding this issue\par \par I suggested cautious observation.  If she experiences progressive neurologic symptoms particularly cognitive decline then additional testing possibly FDG PET scan of the brain can be considered.  She will be reevaluated in 4 months.  Telephone contact will be maintained in the meantime.

## 2022-03-24 NOTE — HISTORY OF PRESENT ILLNESS
[FreeTextEntry1] : Ms. Elana Coughlin was initially evaluated via telemedicine on January 19, 2022.  She is a 71-year-old left-handed patient who was referred for neurologic evaluation by Dr. Conor Fitzgerald, her nephrologist.  She was accompanied to the office by her sister.\par \par She suffers from chronic kidney disease.  She has a lifelong history of anxiety, depression and obsessive-compulsive disorder.  She is under the care of a psychiatrist Dr. Costa Yang.\par \par She suffers from chronic body pain and has been diagnosed with fibromyalgia.  She is under the care of Alyssia Oneal NP.\par \par In 2015, she experienced a self-limited episode of inability to speak.  She was evaluated at [facility] and was prescribed aspirin for a possible TIA.  She has been noncompliant.  No other details are available.\par \par She had multiple other complaints.  She has a longstanding tremors and limb jerking.  These became aggravated when she took gabapentin 800 mg 3 times a day in early 2020. They ceased when she discontinued that medication.  She was restarted on gabapentin 300 mg 3 times a day in late 2020 with a recurrence of symptoms which prompted discontinuation of gabapentin.  She was subsequently treated with Lyrica and duloxetine without worsening of tremors.  Her fibromyalgia pain however was not improved.  She complains of imbalance for least 6 months.  She has had language and memory difficulties for over 2 years.  Her tremors and jerking increases with anxiety.  She denies head or vocal tremor.  She has chronic low back pain.\par \par Based upon my initial telemedicine evaluation, I felt that her myoclonic jerking and tremors were possibly related to kidney disease and polypharmacy.  This impression was supported by the fact that her symptoms worsen with taking high doses of renally excreted medications like gabapentin.  I was uncertain of the cause of her cognitive and language complaints.\par \par MRI of the brain revealed mild atrophy and white matter changes likely due to mild chronic microvascular ischemic changes.  Routine EEG was normal.  A serologic evaluation was notable for a mildly elevated methylmalonic acid.  Vitamin B12 level was 687.  Parietal cell antibodies were negative.  Intrinsic factor antibodies were QNS.\par \par Since last seen, her fluoxetine dose was decreased from 40 to 10 mg/day.  Her tremors are greatly improved but she is more depressed, anxious and obsessive.  She is a bit unsteady on her feet.  She admits that she takes oxycodone at least once daily for chronic back pain.\par \par Past surgical history is notable for breast augmentation, benign lumpectomy, BISI/BSO for cervical cancer and tonsillectomy.  She suffers from hypertension, hyperlipidemia, renal insufficiency, anorexia, bulimia, chronic constipation, benign thyroid disease, osteopenia, fibromyalgia, obsessive-compulsive disorder, anxiety and depression.  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  She is a former smoker and nondrinker.  Family history is notable for a mother with hypertension and hyperlipidemia.  Her sister suffers from breast cancer and hyperlipidemia.  Both parents suffered from anxiety, depression and obsessive-compulsive disorders.

## 2022-05-06 ENCOUNTER — RX RENEWAL (OUTPATIENT)
Age: 72
End: 2022-05-06

## 2022-06-24 ENCOUNTER — RX RENEWAL (OUTPATIENT)
Age: 72
End: 2022-06-24

## 2022-08-02 ENCOUNTER — APPOINTMENT (OUTPATIENT)
Dept: NEPHROLOGY | Facility: CLINIC | Age: 72
End: 2022-08-02

## 2022-08-08 ENCOUNTER — APPOINTMENT (OUTPATIENT)
Dept: ENDOCRINOLOGY | Facility: CLINIC | Age: 72
End: 2022-08-08

## 2022-08-16 ENCOUNTER — APPOINTMENT (OUTPATIENT)
Dept: ENDOCRINOLOGY | Facility: CLINIC | Age: 72
End: 2022-08-16

## 2022-08-16 ENCOUNTER — APPOINTMENT (OUTPATIENT)
Dept: NEPHROLOGY | Facility: CLINIC | Age: 72
End: 2022-08-16

## 2022-08-16 VITALS
OXYGEN SATURATION: 93 % | HEIGHT: 67 IN | DIASTOLIC BLOOD PRESSURE: 79 MMHG | TEMPERATURE: 97.5 F | HEART RATE: 69 BPM | SYSTOLIC BLOOD PRESSURE: 127 MMHG

## 2022-08-16 VITALS
HEIGHT: 67 IN | BODY MASS INDEX: 20.4 KG/M2 | DIASTOLIC BLOOD PRESSURE: 70 MMHG | SYSTOLIC BLOOD PRESSURE: 114 MMHG | RESPIRATION RATE: 16 BRPM | HEART RATE: 72 BPM | OXYGEN SATURATION: 97 % | TEMPERATURE: 97.7 F | WEIGHT: 130 LBS

## 2022-08-16 DIAGNOSIS — M85.80 OTHER SPECIFIED DISORDERS OF BONE DENSITY AND STRUCTURE, UNSPECIFIED SITE: ICD-10-CM

## 2022-08-16 PROCEDURE — 99214 OFFICE O/P EST MOD 30 MIN: CPT | Mod: 25

## 2022-08-16 PROCEDURE — 99213 OFFICE O/P EST LOW 20 MIN: CPT

## 2022-08-16 PROCEDURE — 96372 THER/PROPH/DIAG INJ SC/IM: CPT

## 2022-08-16 RX ORDER — DENOSUMAB 60 MG/ML
60 INJECTION SUBCUTANEOUS
Refills: 0 | Status: ACTIVE | COMMUNITY

## 2022-08-16 RX ORDER — BUMETANIDE 2 MG/1
2 TABLET ORAL DAILY
Qty: 90 | Refills: 3 | Status: DISCONTINUED | COMMUNITY
Start: 2021-10-13 | End: 2022-08-16

## 2022-08-16 RX ORDER — DENOSUMAB 60 MG/ML
60 INJECTION SUBCUTANEOUS
Qty: 1 | Refills: 0 | Status: COMPLETED | OUTPATIENT
Start: 2022-08-16

## 2022-08-16 RX ADMIN — DENOSUMAB 0 MG/ML: 60 INJECTION SUBCUTANEOUS at 00:00

## 2022-08-17 RX ORDER — SERTRALINE 25 MG/1
25 TABLET, FILM COATED ORAL
Qty: 30 | Refills: 0 | Status: DISCONTINUED | COMMUNITY
Start: 2022-07-15

## 2022-08-17 RX ORDER — AMOXICILLIN 500 MG/1
500 TABLET, FILM COATED ORAL
Qty: 14 | Refills: 0 | Status: DISCONTINUED | COMMUNITY
Start: 2022-06-07

## 2022-08-17 RX ORDER — CLONAZEPAM 0.5 MG/1
0.5 TABLET ORAL
Qty: 30 | Refills: 0 | Status: DISCONTINUED | COMMUNITY
Start: 2022-06-07

## 2022-08-17 RX ORDER — OXYCODONE AND ACETAMINOPHEN 7.5; 325 MG/1; MG/1
7.5-325 TABLET ORAL
Qty: 120 | Refills: 0 | Status: DISCONTINUED | COMMUNITY
Start: 2022-05-11

## 2022-08-17 RX ORDER — LAMOTRIGINE 150 MG/1
150 TABLET ORAL
Qty: 180 | Refills: 0 | Status: DISCONTINUED | COMMUNITY
Start: 2022-05-31

## 2022-08-17 NOTE — REASON FOR VISIT
[Follow-Up] : a follow-up visit [Friend] : friend [FreeTextEntry1] : Follow up for CKD, hyperkalemia and osteoporosis.

## 2022-08-17 NOTE — HISTORY OF PRESENT ILLNESS
[FreeTextEntry1] : Major issue is chronic back pain. The patient is doing for nerve ablation.  She is taking Lokelma daily and repeat labs, discussed at this visit, show a normal K. Kidney function stable. The patient is asking about two medications advertised on TV: Farxiga and Kerendia (decrease progression of renal disease). \par

## 2022-08-17 NOTE — REVIEW OF SYSTEMS
[Feeling Tired] : feeling tired [Chest Pain] : no chest pain [Palpitations] : no palpitations [Constipation] : constipation [As Noted in HPI] : as noted in HPI [Depression] : depression [Negative] : Neurological

## 2022-08-17 NOTE — ASSESSMENT
[FreeTextEntry1] : 1 CKD stage III, chronic interstitial nephritis. Creatinine stable. \par 2. Hyponatremia, dilutional and low solute diet. To decrease water intake.\par 3. Hyperkalemia: controlled on daily Lokelma.\par 4. Osteoporosis: follow up with Endo today.\par We discussed that Kerendia (Fineronone) is relatively contraindicated by hyperkalemia. She may be a candidate for Farxiga as it may help w/ slowing progression of renal disease, hyperkalemia and hyponatremia. She will inquire w/ her pharmacy regarding the co-pay. \par Return in two months.

## 2022-09-02 NOTE — PROCEDURE
[FreeTextEntry1] : Thyroid ultrasound August 16, 2022\par Bilateral goiter\par Markedly enlarged left difficult to assess generally heterogeneous enlargement versus distinct nodules\par Right 10 mm X 7 mm X 13 mm nodule
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
## 2022-09-02 NOTE — PAST MEDICAL HISTORY
[Menarche Age ____] : age at menarche was [unfilled] [Menopause Age____] : age at menopause was [unfilled] [History of Hormone Replacement Treatment] : has no history of hormone replacement treatment

## 2022-09-02 NOTE — PHYSICAL EXAM
[Alert] : alert [Well Nourished] : well nourished [No Acute Distress] : no acute distress [Well Developed] : well developed [Normal Sclera/Conjunctiva] : normal sclera/conjunctiva [EOMI] : extra ocular movement intact [No Proptosis] : no proptosis [Clear to Auscultation] : lungs were clear to auscultation bilaterally [Normal S1, S2] : normal S1 and S2 [Normal Rate] : heart rate was normal [Regular Rhythm] : with a regular rhythm [No Edema] : no peripheral edema [Normal Bowel Sounds] : normal bowel sounds [Not Tender] : non-tender [Not Distended] : not distended [Soft] : abdomen soft [Normal Anterior Cervical Nodes] : no anterior cervical lymphadenopathy [No Spinal Tenderness] : no spinal tenderness [Spine Straight] : spine straight [No Stigmata of Cushings Syndrome] : no stigmata of Cushings Syndrome [Normal Reflexes] : deep tendon reflexes were 2+ and symmetric [No Tremors] : no tremors [Oriented x3] : oriented to person, place, and time [de-identified] : Bilateral nodular goiter left greater than right [de-identified] : imbalance, poor gait

## 2022-09-02 NOTE — ASSESSMENT
[Denosumab Therapy] : Risks  and benefits of denosumab therapy were discussed with the patient including eczema, cellulitis, osteonecrosis of the jaw and atypical femur fractures [FreeTextEntry1] : 72  year old female w/ osteopenia in the setting of CKD.  Patient meets criteria for osteoporosis due to increased risk of fracture.\par \par Pt states she was told of low bone density after BMD 1/2021.  The bone density meets the criteria for osteopenia but the patient meets the criteria for osteoporosis based on increased risk of future fracture.  Additional risk factors include family history of osteoporosis and hip fracture mother and age.  The patient demonstrates an unsteady gait on physical examination, for unclear reasons.  This does increase the risk of future falls which also increases the risk of fracture.  Previous anorexia which may have contributed to osteoporosis does not currently have an impact independent of  the bone density on future fracture risk. \par FRAX shows a 18% risk for 10 year major osteoporotic fracture/ 5.5% risk for 10 year hip fracture.\par Recent labs indicated hyperparathyroidism, presumably a secondary hyperparathyroidism of renal insufficiency.  Patient advised to restart vitamin D 2000 units a day to help suppress parathyroid excess.\par Exam is notable for significant multinodular goiter left greater than right thyroid ultrasound.  Patient appears clinically euthyroid.  Requested the patient obtain old thyroid records for comparison.  Consider fine-needle aspiration if there is any significant change compared to prior results.\par Patient began Prolia February 2022.  Tolerating well.\par Patient now requesting testosterone replacement therapy as per pain management recommendations.  Risks and benefits of testosterone in women discussed in great detail including androgenic side effects such as hirsutism and potential conversion to estrogen.  Alternative although nonstandard use of anabolic steroid oxandrolone reviewed.  FDA indication for pain associate with osteoporosis reviewed.  Patient would like to consider oxandrolone although she understands that this is a nonstandard therapy.  Risks and benefits of anabolic steroids including effects on liver reviewed in detail.\par   \par Pharmacological Management of Osteoporosis in Postmenopausal Women: An Endocrine Society  Clinical Practice Guideline. Fang R, Lesa ARGUETA., Kobi DM, Moseley AM, Murfay MH, Santo D. JCEM 2019 104: [id number]

## 2022-09-06 ENCOUNTER — RX RENEWAL (OUTPATIENT)
Age: 72
End: 2022-09-06

## 2022-09-26 RX ORDER — TORSEMIDE 5 MG/1
5 TABLET ORAL TWICE DAILY
Qty: 90 | Refills: 3 | Status: DISCONTINUED | COMMUNITY
Start: 2019-09-09 | End: 2022-09-26

## 2022-11-29 ENCOUNTER — APPOINTMENT (OUTPATIENT)
Dept: ENDOCRINOLOGY | Facility: CLINIC | Age: 72
End: 2022-11-29

## 2022-11-29 VITALS
HEIGHT: 67 IN | HEART RATE: 80 BPM | TEMPERATURE: 98 F | RESPIRATION RATE: 14 BRPM | DIASTOLIC BLOOD PRESSURE: 90 MMHG | WEIGHT: 130 LBS | BODY MASS INDEX: 20.4 KG/M2 | OXYGEN SATURATION: 99 % | SYSTOLIC BLOOD PRESSURE: 150 MMHG

## 2022-11-29 PROCEDURE — 99214 OFFICE O/P EST MOD 30 MIN: CPT

## 2022-11-30 ENCOUNTER — NON-APPOINTMENT (OUTPATIENT)
Age: 72
End: 2022-11-30

## 2022-12-01 NOTE — END OF VISIT
[FreeTextEntry3] : This note was written by Lala Thornton on ( November 29, 2022) acting as a medical scribe for Dr. Michel  This note was authored by the medical scribe for me. I have reviewed, edited, and revised the note as needed. I am in agreement with the exam findings, imaging findings, and treatment plan.  Ryland Michel MD

## 2022-12-01 NOTE — PROCEDURE
[FreeTextEntry1] : Thyroid ultrasound August 16, 2022\par Bilateral goiter\par Markedly enlarged left difficult to assess generally heterogeneous enlargement versus distinct nodules\par Right 10 mm X 7 mm X 13 mm nodule\par \par Bone mineral density: 1/2021\par Spine: -0.7 normal\par L Total hip: -1.8 osteopenia, R -2.0 osteopenia\par L Femoral neck: -2.1 osteopenia, R -2.2 osteopenia

## 2022-12-01 NOTE — ADDENDUM
[FreeTextEntry1] : November 30, 2022.\par Telephone.  The patient is well, has no progression of neurological symptoms headache etc. Ryland Michel MD
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
## 2022-12-01 NOTE — ASSESSMENT
[Denosumab Therapy] : Risks  and benefits of denosumab therapy were discussed with the patient including eczema, cellulitis, osteonecrosis of the jaw and atypical femur fractures [FreeTextEntry1] : 72  year old female w/ osteopenia in the setting of CKD.  Patient meets criteria for osteoporosis due to increased risk of fracture.\par \par Pt states she was told of low bone density after BMD 1/2021.  The bone density meets the criteria for osteopenia but the patient meets the criteria for osteoporosis based on increased risk of future fracture.  Additional risk factors include family history of osteoporosis and hip fracture mother and age.  \par \par The patient demonstrates an unsteady gait on physical examination, for unclear reasons.  This does increase the risk of future falls which also increases the risk of fracture.  Previous anorexia which may have contributed to osteoporosis does not currently have an impact independent of  the bone density on future fracture risk. \par FRAX shows a 18% risk for 10 year major osteoporotic fracture/ 5.5% risk for 10 year hip fracture.\par Recent labs indicated hyperparathyroidism, presumably a secondary hyperparathyroidism of renal insufficiency.  Patient advised to restart vitamin D 2000 units a day to help suppress parathyroid excess.\par \par Exam is notable for significant multinodular goiter left greater than right thyroid ultrasound.  Patient appears clinically euthyroid.  Requested the patient obtain old thyroid records for comparison.  Consider fine-needle aspiration if there is any significant change compared to prior results.\par Patient began Prolia February 2022.  Tolerating well.\par \par Pt is taking oxandrolone. Pt is complaining of side effects from medication such as insomnia, unsteady balance.  I doubt these are true side effects of this medication but she can stop it and observe.\par   \par Labs July 2022 \par  \par Calcium 9.7\par Creatinine 1.90 elevated \par Patient fell at home had injury to left forehead.  Current physical examination is unremarkable except for the abrasions.  She has no obvious neurological deficits.  I still recommend evaluation in the emergency room but patient declines.\par Follow up in 3 months for Prolia \par \par \par Pharmacological Management of Osteoporosis in Postmenopausal Women: An Endocrine Society  Clinical Practice Guideline. Fang R, Lesa ARGUETA., Kobi DM, Lorelei AM, Nando PINEDA, Santo D. JCEM 2019 104: [phone]

## 2022-12-01 NOTE — PHYSICAL EXAM
[Alert] : alert [Well Nourished] : well nourished [No Acute Distress] : no acute distress [Well Developed] : well developed [Normal Sclera/Conjunctiva] : normal sclera/conjunctiva [EOMI] : extra ocular movement intact [No Proptosis] : no proptosis [Clear to Auscultation] : lungs were clear to auscultation bilaterally [Normal S1, S2] : normal S1 and S2 [Normal Rate] : heart rate was normal [Regular Rhythm] : with a regular rhythm [No Edema] : no peripheral edema [Normal Bowel Sounds] : normal bowel sounds [Not Tender] : non-tender [Not Distended] : not distended [Soft] : abdomen soft [Normal Anterior Cervical Nodes] : no anterior cervical lymphadenopathy [No Spinal Tenderness] : no spinal tenderness [Spine Straight] : spine straight [No Stigmata of Cushings Syndrome] : no stigmata of Cushings Syndrome [Normal Reflexes] : deep tendon reflexes were 2+ and symmetric [Oriented x3] : oriented to person, place, and time [de-identified] : Abrasion, ecchymosis Left forehead [de-identified] : Bilateral nodular goiter left greater than right [de-identified] : imbalance, poor gait [de-identified] : small hematoma left side of face  [de-identified] : mild tremor

## 2022-12-09 ENCOUNTER — APPOINTMENT (OUTPATIENT)
Dept: INTERNAL MEDICINE | Facility: CLINIC | Age: 72
End: 2022-12-09

## 2022-12-09 VITALS
DIASTOLIC BLOOD PRESSURE: 76 MMHG | TEMPERATURE: 97.5 F | RESPIRATION RATE: 15 BRPM | HEART RATE: 62 BPM | HEIGHT: 67 IN | OXYGEN SATURATION: 98 % | SYSTOLIC BLOOD PRESSURE: 133 MMHG

## 2022-12-09 DIAGNOSIS — R21 RASH AND OTHER NONSPECIFIC SKIN ERUPTION: ICD-10-CM

## 2022-12-09 DIAGNOSIS — F41.8 OTHER SPECIFIED ANXIETY DISORDERS: ICD-10-CM

## 2022-12-09 PROCEDURE — 99204 OFFICE O/P NEW MOD 45 MIN: CPT

## 2022-12-09 RX ORDER — BUPROPION HYDROCHLORIDE 300 MG/1
300 TABLET, EXTENDED RELEASE ORAL
Refills: 0 | Status: ACTIVE | COMMUNITY

## 2022-12-09 RX ORDER — LAMOTRIGINE 100 MG/1
100 TABLET ORAL
Refills: 0 | Status: ACTIVE | COMMUNITY

## 2022-12-09 RX ORDER — OXANDROLONE 10 MG/1
10 TABLET ORAL DAILY
Qty: 30 | Refills: 0 | Status: COMPLETED | COMMUNITY
Start: 2022-08-16 | End: 2022-12-09

## 2022-12-09 RX ORDER — TRAZODONE HYDROCHLORIDE 100 MG/1
100 TABLET ORAL
Refills: 0 | Status: COMPLETED | COMMUNITY
End: 2022-12-09

## 2022-12-09 NOTE — PHYSICAL EXAM
[No Focal Deficits] : no focal deficits [Normal Gait] : normal gait [Deep Tendon Reflexes (DTR)] : deep tendon reflexes were 2+ and symmetric [Normal Affect] : the affect was normal [Alert and Oriented x3] : oriented to person, place, and time [Normal Insight/Judgement] : insight and judgment were intact [Normal] : affect was normal and insight and judgment were intact [No Acute Distress] : no acute distress [de-identified] : thin female  [de-identified] : + skin bruising on b/l upper extremities, scabbed areas. no vesciles, no acute rash. + scabbed wounds but no linear cuts.

## 2022-12-09 NOTE — HISTORY OF PRESENT ILLNESS
[FreeTextEntry8] : 72 year old female here c/o of rash on both arms for approx 2 weeks and reports it originally started 6 months ago and never gets evaluated. Pt reports bruises easily and has history of CKD, has a dog but no acute scratches or sick contacts or changes in bathing or lotions. 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 denies any si/hi mood stable. \par pt refused to be weighed \par Pt reports applying neosporin on the rash area mild relief \par Pt denies any pain around the area.\par Pt states it itches when scabs.\par Pt reports easily bruises.\par Pt states following [facility] for CKD progression from stage G3b/A1 to stage IV.\par Pt has no further complaints.

## 2022-12-09 NOTE — ASSESSMENT
[FreeTextEntry1] : #RASH\par - 2/2 to brusing vs unknown etiology\par - f/u derm\par \par #Annorexia \par #Depression and anxiety\par - f/u psych\par - annorexia in remission - has good appetiitte\par - reports has been on wellbutrin for years\par - discussed risks and benefits of being on wellbutrin while hx of annorexia\par - pt reports will f/u psych and has been tolerating well for years\par - mood stable denies any si/hi\par \par #CKD\par -following nephro\par - watch salt intake\par - discussed, reviewed and interpreted previous blood work with pt\par \par \par #HL\par Continue current management\par Compliant w/ medication\par No side effects\par Follow low cholesterol diet\par Discussed healthy diet and lifestyle.\par Avoid tobacco. \par Get more exercise. Try to get about 150 minutes of physical activity every week, or about 30 minutes per day for most days of the week.\par Keep a healthy weight. Losing even 10% of your body weight makes a difference in your cholesterol levels.\par Reduce the effect of negative emotions, Learn healthy ways to deal with anger, stress or other negative emotions.\par Replace saturated fat and trans fat with healthier fats.\par Bad Fats - margarine, french fries, doughnuts, cookies, pastries, crackers, processed meats, canola oil and hydrogenated oils\par Good Fats/Unsaturated fats - avocados, eggs (with out the yellow), coconut oil, Raw Nuts, [place], Flaxseed, Leafy green veg, organic butter, organic nut \par Eat heart-healthy foods like fruits, vegetables, poultry, fish and whole grains. Limit red meat, sugary products and dairy products made with whole milk.\par \par #HTN\par Continue Medication\par DASH DIET \par Exercise.\par Lower your salt intake.\par Reduce your alcohol consumption.\par Learn relaxation methods.\par Eating is a very important part of controlling blood pressure. Recommend Total salt intake to 2.4g/day.\par Do not add salt while preparing or eating your meals.\par Try to avoid red meats, sweets and sugar containing beverages.\par Ensure you are eating 5 fruits and vegetables a day as well as whole grains.\par \par \par \par pt agrees and understands plan via teach back method. all questions answered.\par

## 2022-12-09 NOTE — REVIEW OF SYSTEMS
[Itching] : Itching [Skin Rash] : skin rash [Anxiety] : anxiety [Depression] : depression [Negative] : Neurological [Mole Changes] : no mole changes [Nail Changes] : no nail changes [Hair Changes] : no hair changes

## 2022-12-09 NOTE — HEALTH RISK ASSESSMENT
[Former] : Former [No falls in past year] : Patient reported no falls in the past year [No] : No [0] : 2) Feeling down, depressed, or hopeless: Not at all (0) [[id number]] : 0

## 2022-12-16 ENCOUNTER — APPOINTMENT (OUTPATIENT)
Dept: NEPHROLOGY | Facility: CLINIC | Age: 72
End: 2022-12-16

## 2022-12-16 DIAGNOSIS — R31.29 OTHER MICROSCOPIC HEMATURIA: ICD-10-CM

## 2022-12-16 PROCEDURE — 99442: CPT | Mod: 95

## 2022-12-16 NOTE — REASON FOR VISIT
[Home] : at home, [unfilled] , at the time of the visit. [Medical Office: ([facility])___] : at the medical office located in  [Verbal consent obtained from patient] : the patient, [unfilled] [Follow-Up] : a follow-up visit [FreeTextEntry1] : For worsening CKD, increase in creatinine and side effects from Oxandrolone.

## 2022-12-16 NOTE — ASSESSMENT
[FreeTextEntry1] : 1. Worsening CKD. Likely related to side effect from Oxandrolone. The patient has discontinued this drug and will repeat labs next week. Will call after results are available. No other obvious reason for worsening kidney function. If creatinine is not improved will discuss a diagnostic renal biopsy.\par 2. Hyperkalemia: controlled w/ Lokelma. \par Plan: discuss labs results when available.

## 2022-12-16 NOTE — HISTORY OF PRESENT ILLNESS
[FreeTextEntry1] : The patient consented to a telephone visit.  The patient was located at home and I was located at my office. \par The patient was recently seen by her Endocrinologist, Dr. Michel. She complained that since starting Oxandrolone she did notice an increase in strength and balance but developed hallucinations and worsening psychiatric symptoms.  She had a blood test done recently and her serum creatinine went up from 2.1 to 2.9.  She stopped the Oxandrolone 2 weeks ago.  She expresses the concern that this medication is responsible for her side effects and increase in creatinine. \par She is feeling better off the Oxandrolone and back to her normal self.\par The purpose of this telephonic visit is to address the increase in serum creatinine.

## 2023-01-17 ENCOUNTER — APPOINTMENT (OUTPATIENT)
Dept: PSYCHIATRY | Facility: CLINIC | Age: 73
End: 2023-01-17

## 2023-01-31 ENCOUNTER — APPOINTMENT (OUTPATIENT)
Dept: NEPHROLOGY | Facility: CLINIC | Age: 73
End: 2023-01-31
Payer: MEDICARE

## 2023-01-31 VITALS
SYSTOLIC BLOOD PRESSURE: 142 MMHG | HEART RATE: 61 BPM | DIASTOLIC BLOOD PRESSURE: 79 MMHG | OXYGEN SATURATION: 96 % | TEMPERATURE: 97.5 F | HEIGHT: 67 IN

## 2023-01-31 PROCEDURE — 99213 OFFICE O/P EST LOW 20 MIN: CPT

## 2023-01-31 NOTE — ASSESSMENT
[FreeTextEntry1] : 1. Stage IV CKD, stable. Will start Farxiga to delay progression. Her eGFR is 21.  Will check labs after 3 weekks. Patient aware the a small increase in creatinine is possible. Aware of the risk of UTI. \par 2 Hyperkalemia: Discussed to take Lokelma as prescribed.\par 3.Hypertension: borderline. Farxiga should help. \par 4 Osteoporosis. the patient states that her legs were stronger and her balance better when she was taking Oxandrolone. However she felt that she was becoming angrier and more belligerent. She has discontinued this medication. Will discuss w/ End at next appointment.\par Labs in 3 weeks. Follow up in 3 months.

## 2023-01-31 NOTE — HISTORY OF PRESENT ILLNESS
[FreeTextEntry1] : The patient serum creatinine increased in December to 2.86.  She increased her hydration and underwent repeat labs on January 23. Creatinine down to 2.45. K was 5.6.  The patient is supposed to take Lokelma three times per week but admits to forget it often....\par Overall no changes in her condition.  She still struggle w/ chronic pain syndrome and is on pain management program.  I have reviewed her medications today and reconciled the chart.  She is taking also Oxycodone and her pain management doctor has suggest d a new regimen to include OxyContin 15 mg twice a day w/ rescue Percocet up to 4 times per day. She has not decided yet if she will follow this regimen.  The patient is inquiring about Farxiga to delay progression of her CKD.

## 2023-01-31 NOTE — REASON FOR VISIT
[Follow-Up] : a follow-up visit [Friend] : friend [FreeTextEntry1] : For CKD IV secondary to chronic interstitial nephritis.

## 2023-03-14 ENCOUNTER — APPOINTMENT (OUTPATIENT)
Dept: ENDOCRINOLOGY | Facility: CLINIC | Age: 73
End: 2023-03-14
Payer: MEDICARE

## 2023-03-14 VITALS
RESPIRATION RATE: 16 BRPM | WEIGHT: 130 LBS | HEIGHT: 67 IN | HEART RATE: 73 BPM | SYSTOLIC BLOOD PRESSURE: 130 MMHG | OXYGEN SATURATION: 98 % | BODY MASS INDEX: 20.4 KG/M2 | DIASTOLIC BLOOD PRESSURE: 80 MMHG

## 2023-03-14 VITALS — BODY MASS INDEX: 21.63 KG/M2 | HEIGHT: 65 IN

## 2023-03-14 PROCEDURE — 96372 THER/PROPH/DIAG INJ SC/IM: CPT

## 2023-03-14 PROCEDURE — 77080 DXA BONE DENSITY AXIAL: CPT | Mod: GA

## 2023-03-14 PROCEDURE — 99214 OFFICE O/P EST MOD 30 MIN: CPT | Mod: 25

## 2023-03-14 PROCEDURE — ZZZZZ: CPT

## 2023-03-14 RX ORDER — GLUCOSAMINE HCL 500 MG
75 MCG TABLET ORAL
Qty: 30 | Refills: 0 | Status: ACTIVE | COMMUNITY
Start: 2023-03-14

## 2023-03-14 RX ORDER — DENOSUMAB 60 MG/ML
60 INJECTION SUBCUTANEOUS
Qty: 1 | Refills: 0 | Status: COMPLETED | OUTPATIENT
Start: 2023-03-14

## 2023-03-14 RX ORDER — UBIDECARENONE/VIT E ACET 100MG-5
50 MCG CAPSULE ORAL
Qty: 100 | Refills: 0 | Status: DISCONTINUED | COMMUNITY
Start: 2021-10-14 | End: 2023-03-14

## 2023-03-14 RX ADMIN — DENOSUMAB 60 MG/ML: 60 INJECTION SUBCUTANEOUS at 00:00

## 2023-03-14 NOTE — REASON FOR VISIT
[Consultation] : a consultation visit [Osteoporosis] : osteoporosis [Family Member] : family member [FreeTextEntry2] : Conor Fitzgerald MD
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
## 2023-03-14 NOTE — CONSULT LETTER
[Dear  ___] : Dear  [unfilled], [Consult Letter:] : I had the pleasure of evaluating your patient, [unfilled]. [Please see my note below.] : Please see my note below. [Consult Closing:] : Thank you very much for allowing me to participate in the care of this patient.  If you have any questions, please do not hesitate to contact me. [FreeTextEntry2] : Conor Fitzgerald MD

## 2023-03-14 NOTE — END OF VISIT
[FreeTextEntry3] : I, Garcia Palumbo, authored this note working as a medical scribe for Dr. Michel.  10/19/2021.  3:00PM.  This note was authored by the medical scribe for me. I have reviewed, edited, and revised the note as needed. I am in agreement with the exam findings, imaging findings, and treatment plan.  Ryland Michel MD

## 2023-03-14 NOTE — PHYSICAL EXAM
[Alert] : alert [Well Nourished] : well nourished [No Acute Distress] : no acute distress [Well Developed] : well developed [Normal Sclera/Conjunctiva] : normal sclera/conjunctiva [EOMI] : extra ocular movement intact [No Proptosis] : no proptosis [Thyroid Not Enlarged] : the thyroid was not enlarged [No Thyroid Nodules] : no palpable thyroid nodules [Clear to Auscultation] : lungs were clear to auscultation bilaterally [Normal S1, S2] : normal S1 and S2 [Normal Rate] : heart rate was normal [Regular Rhythm] : with a regular rhythm [No Edema] : no peripheral edema [Normal Bowel Sounds] : normal bowel sounds [Not Tender] : non-tender [Not Distended] : not distended [Soft] : abdomen soft [Normal Anterior Cervical Nodes] : no anterior cervical lymphadenopathy [No Spinal Tenderness] : no spinal tenderness [Spine Straight] : spine straight [No Stigmata of Cushings Syndrome] : no stigmata of Cushings Syndrome [Normal Reflexes] : deep tendon reflexes were 2+ and symmetric [No Tremors] : no tremors [Oriented x3] : oriented to person, place, and time [de-identified] : corrected exam: goiter L > R [de-identified] : imbalance, poor gait

## 2023-03-14 NOTE — ASSESSMENT
[Denosumab Therapy] : Risks  and benefits of denosumab therapy were discussed with the patient including eczema, cellulitis, osteonecrosis of the jaw and atypical femur fractures [FreeTextEntry1] : Ms. MARTINEZ is a 71 year old female w/ osteopenia in the setting of CKD\par \par Pt states she was told of low bone density after BMD 1/2021.  The bone density meets the criteria for osteopenia but the patient meets the criteria for osteoporosis based on increased risk of future fracture.  Additional risk factors include family history of osteoporosis and hip fracture mother and age.  The patient demonstrates an unsteady gait on physical examination, for unclear reasons.  This does increase the risk of future falls which also increases the risk of fracture.  Previous anorexia which may have contributed to osteoporosis does not currently have an impact independent the bone density on future fracture risk. \par FRAX shows a 18% risk for 10 year major osteoporotic fracture/ 5.5% risk for 10 year hip fracture.\par  \par \par Patient advised for an increased risk of future fx. Options for medical therapy discussed in detail. Pt is not a candidate for bisphosphonate therapy due to renal hx. I discussed rx with twice a year Prolia. Risks and benefits discussed including thigh pain, interval fx, and ONJ. I discussed that pt cannot stop Prolia without expecting rapid bone loss and increase in risk for future fx unless they transition to another rx. Furthermore, there is 10 year safety data for Prolia. All questions were answered. Rx information handout provided. Recommend pt discuss use of Prolia w/ Dr. Fitzgerald.\par \par Poor gait and imbalance on physical examination. Recommend pt discuss gait training and PT w/ Dr. Alyssia Oneal.\par \par I recommend pt take no more than 500 mg Ca in addition to dietary intake daily.\par \par Labs reviewed: Ca 9.8, normal. PTH 63, normal. Vitamin D 70.7, normal. Creatinine 2.1, high.\par \par Request labs sent out. Repeat Ca and PTH. Repeat Vitamin levels, 25 and 1,25 \par F/u tentatively in 3 weeks for Prolia, repeat BMD proximal radius only\par \par Pharmacological Management of Osteoporosis in Postmenopausal Women: An Endocrine Society  Clinical Practice Guideline. Fang R, Lesa ARGUETA., Kobi DM, Lorelei AM, Nando MH, Santo D. JCEM 2019 104: [id number]

## 2023-03-14 NOTE — END OF VISIT
Addended by: SHANNAN OVALLE on: 5/23/2019 11:36 AM     Modules accepted: Orders    
[FreeTextEntry3] : This note was written by Lala Thornton on ( November 29, 2022) acting as a medical scribe for Dr. Michel  This note was authored by the medical scribe for me. I have reviewed, edited, and revised the note as needed. I am in agreement with the exam findings, imaging findings, and treatment plan.  Ryland Michel MD

## 2023-03-16 NOTE — PROCEDURE
Patient : Fermin Patton Age: 36 year old Sex: female   MRN: [id number] Encounter Date: 2/17/2018      History     Chief Complaint   Patient presents with   • Abdominal Pain     36-year-old female presenting with left flank pain for the past 2-3 days. She also has had intermittent headaches and nausea. She says this feels identical to when she had a kidney infection in the past. She has had fevers and chills. No abdominal pain. No diarrhea or constipation. No abnormal vaginal discharge or bleeding. No dysuria or urinary frequency or urgency. Pain is severe in the left flank, no treatment tried. Headache is intermittent, currently moderate.            No Known Allergies    Prior to Admission Medications    ACETAMINOPHEN-CODEINE (TYLENOL NO.3) 300-30 MG PER TABLET    Take 1 tablet by mouth every 6 hours as needed for Pain or Cough. Sedation precautions while on meds    ALBUTEROL (PROAIR HFA) 108 (90 BASE) MCG/ACT INHALER    Inhale 2 puffs into the lungs every 4 hours as needed for Shortness of Breath or Wheezing.    AMITRIPTYLINE HCL PO        AZITHROMYCIN (ZITHROMAX) 250 MG TABLET    z pack as ordered    CYCLOBENZAPRINE (FLEXERIL) 10 MG TABLET    Take 1 tablet by mouth 3 times daily as needed for Muscle spasms (and pain ). Sedation precautions while on medication    DIPHENHYDRAMINE (BENADRYL) 25 MG CAPSULE    Take 1 capsule by mouth 3 times daily as needed (to dose with reglan for headache).    IBUPROFEN (MOTRIN) 800 MG TABLET    Take 1 tablet by mouth 3 times daily (with meals). Prn    IBUPROFEN PO        METOCLOPRAMIDE (REGLAN) 10 MG TABLET    Take 1 tablet by mouth 3 times daily as needed (headache).    NAPROXEN (NAPROSYN) 500 MG TABLET    One P.O. BID prn pain.       New Prescriptions    ONDANSETRON (ZOFRAN ODT) 4 MG DISINTEGRATING TABLET    Place 1 tablet onto the tongue every 8 hours as needed for Nausea.    SULFAMETHOXAZOLE-TRIMETHOPRIM (BACTRIM DS) 800-160 MG PER TABLET    Take 1 tablet by mouth 2 times daily  for 7 days.    TRAMADOL (ULTRAM) 50 MG TABLET    Take 1 tablet by mouth every 6 hours as needed for Pain.       No past medical history on file.    Past Surgical History:   Procedure Laterality Date   • BREAST BIOPSY         No family history on file.    Social History   Substance Use Topics   • Smoking status: Current Every Day Smoker   • Smokeless tobacco: Not on file   • Alcohol use Yes      Comment: occ       Review of Systems   Constitutional: Negative for fever.   HENT: Negative for congestion.    Eyes: Negative for visual disturbance.   Respiratory: Negative for shortness of breath.    Cardiovascular: Negative for chest pain.   Gastrointestinal: Negative for abdominal pain.   Genitourinary: Positive for flank pain. Negative for dysuria, frequency and hematuria.   Musculoskeletal: Negative for myalgias.   Skin: Negative for rash.   Neurological: Positive for headaches. Negative for numbness.   Hematological: Does not bruise/bleed easily.   All other systems reviewed and are negative.      Physical Exam     ED Triage Vitals [02/17/18 0754]   ED Triage Vitals Group      Temp 99 °F (37.2 °C)      Pulse 77      Resp 16      /71      SpO2 99 %      EtCO2 mmHg       Height 5' 7\" (1.702 m)      Weight 165 lb (74.8 kg)      Weight Scale Used ED Stated       Physical Exam   Constitutional: No distress.   HENT:   Head: Normocephalic.   Nose: Nose normal.   Mouth/Throat: Oropharynx is clear and moist.   Eyes: Conjunctivae and EOM are normal. Pupils are equal, round, and reactive to light. Right eye exhibits no discharge. Left eye exhibits no discharge.   Neck: Neck supple.   Cardiovascular: Normal rate, regular rhythm, normal heart sounds and intact distal pulses.    Pulmonary/Chest: Effort normal. No stridor. She has no wheezes. She has no rales.   Abdominal: Soft. Bowel sounds are normal. She exhibits no distension. There is no tenderness. There is no rebound and no guarding.   Tender over the left flank    Musculoskeletal: Normal range of motion. She exhibits no tenderness.   Lymphadenopathy:     She has no cervical adenopathy.   Neurological: She is alert. No cranial nerve deficit. Coordination normal.   5/5 strength throughout, normal sensation throughout   Skin: Skin is warm and dry. No rash noted. She is not diaphoretic.   Psychiatric: She has a normal mood and affect. Her behavior is normal.   Nursing note and vitals reviewed.      ED Course     Procedures    Lab Results     Results for orders placed or performed during the hospital encounter of 02/17/18   Urinalysis with Micro & Culture if Indicated   Result Value Ref Range    COLOR YELLOW YELLOW    APPEARANCE HAZY     GLUCOSE(URINE) NEGATIVE NEGATIVE mg/dL    BILIRUBIN NEGATIVE NEGATIVE    KETONES NEGATIVE NEGATIVE mg/dL    SPECIFIC GRAVITY 1.015 1.005 - 1.030    BLOOD MODERATE (A) NEGATIVE    pH 8.5 (H) 5.0 - 7.0 Units    PROTEIN(URINE) NEGATIVE NEGATIVE mg/dL    UROBILINOGEN 1.0 0.0 - 1.0 mg/dL    NITRITE POSITIVE (A) NEGATIVE    LEUKOCYTE ESTERASE TRACE (A) NEGATIVE    Squamous EPI'S 6 to 10 0 - 5 /hpf    RBC 26 to 100 0 - 3 /hpf    WBC 11 to 25 0 - 5 /hpf    BACTERIA LARGE (A) NONE SEEN /hpf    Hyaline Casts NONE SEEN 0 - 5 /lpf    SPECIMEN TYPE URINE, CLEAN CATCH/MIDSTREAM    Macro Urine - Point of Care   Result Value Ref Range    COLOR-POC YELLOW YELLOW    APPEARANCE-POC HAZY     GLUCOSE-POC NEGATIVE NEGATIVE mg/dL    BILIRUBIN-POC NEGATIVE NEGATIVE    KETONES-POC NEGATIVE NEGATIVE mg/dL    SPECIFIC GRAVITY-POC 1.020 1.005 - 1.030    OCCULT BLOOD-POC MODERATE (A) NEGATIVE    PH-POC 8.5 (H) 5.0 - 7.0 Units    PROTEIN-POC NEGATIVE NEGATIVE mg/dL    UROBILINOGEN-POC 1.0 0.0 - 1.0 mg/dL    NITRITE-POC POSITIVE (A) NEGATIVE    WBC (Leukocyte) Esterase POC TRACE (A) NEGATIVE   CBC & Auto Differential   Result Value Ref Range    WBC 13.6 (H) 4.2 - 11.0 K/mcL    RBC 4.11 4.00 - 5.20 mil/mcL    HGB 11.5 (L) 12.0 - 15.5 g/dL    HCT 35.8 (L) 36.0 - 46.5 %    MCV  87.1 78.0 - 100.0 fl    MCH 28.0 26.0 - 34.0 pg    MCHC 32.1 32.0 - 36.5 g/dL    RDW-CV 15.9 (H) 11.0 - 15.0 %     140 - 450 K/mcL    DIFF TYPE AUTOMATED DIFFERENTIAL     Neutrophil 73 %    LYMPH 16 %    MONO 10 %    EOSIN 1 %    BASO 0 %    Absolute Neutrophil 10.0 (H) 1.8 - 7.7 K/mcL    Absolute Lymph 2.2 1.0 - 4.8 K/mcL    Absolute Mono 1.3 (H) 0.3 - 0.9 K/mcL    Absolute Eos 0.1 0.1 - 0.5 K/mcL    Absolute Baso 0.0 0.0 - 0.3 K/mcL   Comprehensive Metabolic Panel   Result Value Ref Range    Sodium 138 135 - 145 mmol/L    Potassium 4.6 3.4 - 5.1 mmol/L    Chloride 101 98 - 107 mmol/L    Carbon Dioxide 27 21 - 32 mmol/L    Anion Gap 15 10 - 20 mmol/L    Glucose 81 65 - 99 mg/dL    BUN 12 6 - 20 mg/dL    Creatinine 0.73 0.51 - 0.95 mg/dL    GFR Estimate,  >90     GFR Estimate, Non African American >90     BUN/Creatinine Ratio 16 7 - 25    CALCIUM 9.3 8.4 - 10.2 mg/dL    TOTAL BILIRUBIN 0.2 0.2 - 1.0 mg/dL    AST/SGOT 26 <38 Units/L    ALT/SGPT 29 <79 Units/L    ALK PHOSPHATASE 58 45 - 117 Units/L    TOTAL PROTEIN 8.2 6.4 - 8.2 g/dL    Albumin 3.4 (L) 3.6 - 5.1 g/dL    GLOBULIN 4.8 (H) 2.0 - 4.0 g/dL    A/G Ratio, Serum 0.7 (L) 1.0 - 2.4   Urine Pregnancy Test Clinitek Status (POC)   Result Value Ref Range    HCG Point of Care NEGATIVE NEGATIVE       EKG Results     none    Radiology Results     Imaging Results    None         ED Medication Orders     Start Ordered     Status Ordering Provider    02/17/18 0959 02/17/18 0958  cefTRIAXone (ROCEPHIN) syringe 1,000 mg  ONCE      Last MAR action:  Given DOVORANY, TRICIA R    02/17/18 0934 02/17/18 0933  metoCLOPramide (REGLAN) injection 10 mg  ONCE      Last MAR action:  Given DOVORANY, TRICIA R    02/17/18 0934 02/17/18 0933  diphenhydrAMINE (BENADRYL) injection 12.5 mg  ONCE      Last MAR action:  Given DOVORANY, TRICIA R    02/17/18 0934 02/17/18 0933  ketorolac (TORADOL) injection 15 mg  ONCE      Last MAR action:  Given DOVORANY, TRICIA R     02/17/18 0929 02/17/18 0928  sodium chloride (NORMAL SALINE) 0.9 % bolus 1,000 mL  ONCE      Last MAR action:  TRICIA Voss     Presentation consistent with pyelonephritis given flank pain and history of this with UTIs. She says after above medication her pain is completely resolved and she feels much better. Her urine is concerning for UTI. She doesn't have any significant pain on my exam to suggest a infected kidney stone and she denies any history of kidney stones. She also insists that this is identical to previous kidney infections that she's had.     She's complaining of an intermittent headache but not worse headache of her life time. No focal neuro deficits on exam. Headache is resolved after above treatment. It was likely result of feeling sick. She also has nausea but no tenderness in her belly otherwise to warrant other imaging.    Patient will be empirically treated with Bactrim, she was given an empiric dose of ceftriaxone here in emergency department. She was discharged with Zofran, tramadol and Bactrim and signs and symptoms that should prompt return.    Clinical Impression     ED Diagnosis   1. Acute UTI     2. Acute pyelonephritis     3. Nausea     4. Generalized headaches         Disposition        Discharge 2/17/2018 11:02 AM  Fermin Patton discharge to home/self care.                    Tricia Canales MD  02/17/18 [id number]     [FreeTextEntry1] : Thyroid ultrasound 3/14/2023\par Bilateral goiter\par Markedly enlarged left difficult to assess generally heterogeneous enlargement versus distinct nodules\par \par Bone mineral density: 3/2023\par Compared to outside study 2021\par Spine: -0.6, stable prior report -0.7\par L Total hip: -2.1 osteopenia prior report -1.8\par L Femoral neck: -2.1 osteopenia prior report -2.1\par 1/3 radius: -1.0 normal no prior\par \par Thyroid ultrasound August 16, 2022\par Bilateral goiter\par Markedly enlarged left difficult to assess generally heterogeneous enlargement versus distinct nodules\par Right 10 mm X 7 mm X 13 mm nodule\par \par Bone mineral density: 1/2021\par Spine: -0.7 normal\par L Total hip: -1.8 osteopenia, R -2.0 osteopenia\par L Femoral neck: -2.1 osteopenia, R -2.2 osteopenia

## 2023-03-16 NOTE — PHYSICAL EXAM
[Alert] : alert [No Acute Distress] : no acute distress [Normal Sclera/Conjunctiva] : normal sclera/conjunctiva [No Proptosis] : no proptosis [Clear to Auscultation] : lungs were clear to auscultation bilaterally [Normal S1, S2] : normal S1 and S2 [Normal Rate] : heart rate was normal [Regular Rhythm] : with a regular rhythm [No Edema] : no peripheral edema [Not Tender] : non-tender [Not Distended] : not distended [Soft] : abdomen soft [No Stigmata of Cushings Syndrome] : no stigmata of Cushings Syndrome [Well Developed] : well developed [No Respiratory Distress] : no respiratory distress [No Accessory Muscle Use] : no accessory muscle use [Normal Rate and Effort] : normal respiratory rate and effort [No Spinal Tenderness] : no spinal tenderness [Scoliosis] : scoliosis present [Oriented x3] : oriented to person, place, and time [Normal Affect] : the affect was normal [de-identified] : Bilateral nodular goiter left greater than right [de-identified] : imbalance, poor gait [de-identified] : small hematoma left side of face  [de-identified] : mild tremor
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
## 2023-03-16 NOTE — REVIEW OF SYSTEMS
[Negative] : Endocrine [Fatigue] : fatigue [As Noted in HPI] : as noted in HPI [Constipation] : constipation [Muscle Weakness] : muscle weakness [Difficulty Walking] : difficulty walking [Dysphagia] : no dysphagia [Neck Pain] : no neck pain [Dysphonia] : no dysphonia

## 2023-03-16 NOTE — ASSESSMENT
[Denosumab Therapy] : Risks  and benefits of denosumab therapy were discussed with the patient including eczema, cellulitis, osteonecrosis of the jaw and atypical femur fractures [FreeTextEntry1] : 72  year old female w/ osteopenia in the setting of CKD.  Patient meets criteria for osteoporosis due to increased risk of fracture.\par \par Pt states she was told of low bone density after BMD 1/2021.  The bone density meets the criteria for osteopenia but the patient meets the criteria for osteoporosis based on increased risk of future fracture.  Additional risk factors include family history of osteoporosis and hip fracture mother and age.  \par \par The patient demonstrates an unsteady gait on physical examination, for unclear reasons.  This does increase the risk of future falls which also increases the risk of fracture.  Previous anorexia which may have contributed to osteoporosis does not currently have an impact independent of  the bone density on future fracture risk. \par FRAX shows a 18% risk for 10 year major osteoporotic fracture/ 5.5% risk for 10 year hip fracture.\par Recent labs indicated hyperparathyroidism, presumably a secondary hyperparathyroidism of renal insufficiency.  Currently taking vitamin D 3000 units a day to help suppress parathyroid excess. Pt was taking oxandrolone. Pt stopped due to side effects from medication such as confusion, hallucinations, easy bruising, insomnia, unsteady balance. I doubt these are true side effects of this medication but she can stop it and observe. Patient began Prolia February 2022.  Tolerating well. BMD density stable from today's visit, will continue with Prolia for now, given during office visit. Has significant gait instability, recommend use of assistive device to prevent falls. Follow up with PCP. \par \par Labs from 1/2023\par Cr: 2.45\par GFR: 21\par Calcium: 9.5\par \par Will obtain PTH and Vitamin D today.\par  \par Exam is notable for significant multinodular goiter left greater than right thyroid ultrasound. Patient appears clinically euthyroid. Brought in Thyroid US from 3/2018: mildly enlarged heterogenous thyroid gland, demonstrating multiple b/l solid nodules, similar to previous study. Bedside US performed today, very difficult to interpret left thyroid lobe, increased in size compared to 2018 US. Will likely need FNA, will have her schedule appointment with Dr. Song. Will obtain TFT's today.\par \par Follow up with Dr. Song next available for possible FNA, follow up with Dr. Michel in 6 months.\par \par Pharmacological Management of Osteoporosis in Postmenopausal Women: An Endocrine Society  Clinical Practice Guideline. Fang QUINTANA, Lesa ARGUETA., Kobi DM, Lorelei AM, Nando MH, Santo D. JCEM 2019 104: [id number]\par \par Patient seen with Dr. Michel\par Ana Felix PA-C\par [facility] \par \par \par

## 2023-03-20 ENCOUNTER — NON-APPOINTMENT (OUTPATIENT)
Age: 73
End: 2023-03-20

## 2023-03-22 ENCOUNTER — APPOINTMENT (OUTPATIENT)
Dept: NEUROLOGY | Facility: CLINIC | Age: 73
End: 2023-03-22

## 2023-03-29 ENCOUNTER — APPOINTMENT (OUTPATIENT)
Dept: ENDOCRINOLOGY | Facility: CLINIC | Age: 73
End: 2023-03-29
Payer: MEDICARE

## 2023-03-29 VITALS
BODY MASS INDEX: 21.66 KG/M2 | WEIGHT: 130 LBS | HEART RATE: 73 BPM | SYSTOLIC BLOOD PRESSURE: 110 MMHG | DIASTOLIC BLOOD PRESSURE: 70 MMHG | HEIGHT: 65 IN | OXYGEN SATURATION: 96 %

## 2023-03-29 PROCEDURE — 10005 FNA BX W/US GDN 1ST LES: CPT

## 2023-03-29 NOTE — ASSESSMENT
[FreeTextEntry1] : 1. Thyroid Nodule/Multinodular goiter \par \par Dominant nodule LMP 3.8 cm heterogenous nodule. \par \par - FNA performed in office today \par - Sample was saved for ThyroSeqv3 \par - Patient tolerated procedure well, provided post-procedural instructions: avoid heavy lifting today, can use ice pack or OTC pain medications (acetaminophen or ibuprofen) if needed. Call back in case of any swelling increasing in size, or severe pain at the site. \par - Will follow up cytology results and formulate further plan with patient and primary endocrinologist \par \par Prudencio Song MD\par [facility]\par Endocrinology at [place] \par [address], [address]\par Ph: [phone]\par Fax: [phone]\par \par

## 2023-03-29 NOTE — PROCEDURE
[Fine Needle Aspiration] : Fine needle aspiration ~T ~C was performed. [Risks] : risks [Patient] : the patient [Benefits] : benefits [Family Member] : the patient's family member [Lidocaine Cream] : lidocaine cream [Supine] : The patient was placed in the supine position with the neck extended as tolerated. [Alcohol] : with alcohol [25 gauge 1.5 inch] : A 25 gauge 1.5 inch needle was used [3 Passes] : 3 passes were made through the mass [Ultrasonic Guidance] : ultrasound guidance was employed [Sent to Histology] : The specimens were prepared in the usual manner and sent to histology. [Thyroseq] : Thyroseq [Tolerated Well] : the patient tolerated the procedure well [Hemostasis] : hemostasis was assured and the patient was discharged in satisfactory condition [No Complications] : There were no complications [Instructions Given] : Handouts/patient instructions were given to patient [de-identified] : LMP 3.8 cm nodule  [FreeTextEntry7] : cold spray  [FreeTextEntry6] : with Dr. Michel

## 2023-03-31 LAB
24R-OH-CALCIDIOL SERPL-MCNC: 74 PG/ML
25(OH)D3 SERPL-MCNC: 87.3 NG/ML
ALBUMIN SERPL ELPH-MCNC: 4.7 G/DL
ALP BLD-CCNC: 53 U/L
ALT SERPL-CCNC: 9 U/L
ANION GAP SERPL CALC-SCNC: 14 MMOL/L
AST SERPL-CCNC: 13 U/L
BILIRUB SERPL-MCNC: 0.3 MG/DL
BUN SERPL-MCNC: 44 MG/DL
CALCIUM SERPL-MCNC: 9.6 MG/DL
CALCIUM SERPL-MCNC: 9.6 MG/DL
CHLORIDE SERPL-SCNC: 103 MMOL/L
CO2 SERPL-SCNC: 22 MMOL/L
CREAT SERPL-MCNC: 2.45 MG/DL
EGFR: 21 ML/MIN/1.73M2
FNA, THYROID: NORMAL
GLUCOSE SERPL-MCNC: 102 MG/DL
PARATHYROID HORMONE INTACT: 201 PG/ML
PHOSPHATE SERPL-MCNC: 4 MG/DL
POTASSIUM SERPL-SCNC: 5.6 MMOL/L
PROT SERPL-MCNC: 6.8 G/DL
SODIUM SERPL-SCNC: 139 MMOL/L
TSH SERPL-ACNC: 0.68 UIU/ML

## 2023-04-03 RX ORDER — DAPAGLIFLOZIN 5 MG/1
5 TABLET, FILM COATED ORAL DAILY
Qty: 30 | Refills: 6 | Status: ACTIVE | COMMUNITY
Start: 2023-04-03 | End: 1900-01-01

## 2023-04-21 DIAGNOSIS — R79.89 OTHER SPECIFIED ABNORMAL FINDINGS OF BLOOD CHEMISTRY: ICD-10-CM

## 2023-04-25 ENCOUNTER — RX RENEWAL (OUTPATIENT)
Age: 73
End: 2023-04-25

## 2023-05-01 ENCOUNTER — NON-APPOINTMENT (OUTPATIENT)
Age: 73
End: 2023-05-01

## 2023-07-28 ENCOUNTER — APPOINTMENT (OUTPATIENT)
Dept: NEPHROLOGY | Facility: CLINIC | Age: 73
End: 2023-07-28

## 2023-08-03 ENCOUNTER — RX RENEWAL (OUTPATIENT)
Age: 73
End: 2023-08-03

## 2023-08-09 ENCOUNTER — APPOINTMENT (OUTPATIENT)
Dept: ENDOCRINOLOGY | Facility: CLINIC | Age: 73
End: 2023-08-09

## 2023-08-10 ENCOUNTER — APPOINTMENT (OUTPATIENT)
Dept: NEPHROLOGY | Facility: CLINIC | Age: 73
End: 2023-08-10
Payer: MEDICARE

## 2023-08-10 PROCEDURE — 99442: CPT | Mod: 95

## 2023-08-10 NOTE — REVIEW OF SYSTEMS
[As Noted in HPI] : as noted in HPI [FreeTextEntry2] : Chronic pain syndrome [FreeTextEntry9] : Low back pain

## 2023-08-10 NOTE — HISTORY OF PRESENT ILLNESS
[FreeTextEntry1] : The patient elected to have a telephone visit as she is experiencing back pain. On a new agent gradually been titrated up.  Had labs done at [facility] and send a copy by email. I have reviewed the labs. Result were not printed as the date transmitted timed out. The serum creatinine was lower than 2. The K was 5.7 and the patient is now taking Lokelma daily. Otherwise doing better. She has been able to wean off narcotics. She is following w/ her pain doctor and her psychiatrist. The patient states that at every visit her BP is normal.

## 2023-08-10 NOTE — ASSESSMENT
[FreeTextEntry1] : 1. Stabe 3b/A2 CKD stable. Continue present regimen including Farxiga. Avoid NSAIDs. Have labs done every three months.  2. Hyperkalemia: diet discussed. Lokelma daily. Labs every three months. Return to office in one year.

## 2023-08-10 NOTE — REASON FOR VISIT
[Home] : at home, [unfilled] , at the time of the visit. [Medical Office: ([facility])___] : at the medical office located in  [Verbal consent obtained from patient] : the patient, [unfilled] [Follow-Up] : a follow-up visit [FreeTextEntry1] : Routine follow up for CKD

## 2023-08-28 ENCOUNTER — APPOINTMENT (OUTPATIENT)
Dept: ENDOCRINOLOGY | Facility: CLINIC | Age: 73
End: 2023-08-28

## 2023-09-14 ENCOUNTER — APPOINTMENT (OUTPATIENT)
Dept: ENDOCRINOLOGY | Facility: CLINIC | Age: 73
End: 2023-09-14

## 2023-09-18 ENCOUNTER — APPOINTMENT (OUTPATIENT)
Dept: ENDOCRINOLOGY | Facility: CLINIC | Age: 73
End: 2023-09-18

## 2023-09-20 ENCOUNTER — APPOINTMENT (OUTPATIENT)
Dept: ENDOCRINOLOGY | Facility: CLINIC | Age: 73
End: 2023-09-20
Payer: MEDICARE

## 2023-09-20 VITALS
HEIGHT: 65 IN | TEMPERATURE: 98.7 F | OXYGEN SATURATION: 96 % | SYSTOLIC BLOOD PRESSURE: 138 MMHG | HEART RATE: 67 BPM | BODY MASS INDEX: 21.66 KG/M2 | DIASTOLIC BLOOD PRESSURE: 81 MMHG | WEIGHT: 130 LBS

## 2023-09-20 PROCEDURE — 99215 OFFICE O/P EST HI 40 MIN: CPT | Mod: 25

## 2023-09-20 PROCEDURE — 96372 THER/PROPH/DIAG INJ SC/IM: CPT

## 2023-09-25 ENCOUNTER — APPOINTMENT (OUTPATIENT)
Dept: INTERNAL MEDICINE | Facility: CLINIC | Age: 73
End: 2023-09-25
Payer: MEDICARE

## 2023-09-25 VITALS
RESPIRATION RATE: 16 BRPM | SYSTOLIC BLOOD PRESSURE: 115 MMHG | TEMPERATURE: 98 F | HEART RATE: 64 BPM | WEIGHT: 130 LBS | HEIGHT: 66 IN | OXYGEN SATURATION: 96 % | BODY MASS INDEX: 20.89 KG/M2 | DIASTOLIC BLOOD PRESSURE: 76 MMHG

## 2023-09-25 DIAGNOSIS — M54.2 CERVICALGIA: ICD-10-CM

## 2023-09-25 DIAGNOSIS — F51.04 PSYCHOPHYSIOLOGIC INSOMNIA: ICD-10-CM

## 2023-09-25 DIAGNOSIS — F50.00 ANOREXIA NERVOSA, UNSPECIFIED: ICD-10-CM

## 2023-09-25 DIAGNOSIS — G89.29 CERVICALGIA: ICD-10-CM

## 2023-09-25 PROCEDURE — 99214 OFFICE O/P EST MOD 30 MIN: CPT

## 2023-09-25 RX ORDER — FLUOXETINE HYDROCHLORIDE 20 MG/1
20 TABLET ORAL
Refills: 0 | Status: COMPLETED | COMMUNITY
End: 2023-09-25

## 2023-10-30 ENCOUNTER — RX RENEWAL (OUTPATIENT)
Age: 73
End: 2023-10-30

## 2023-10-30 RX ORDER — METOPROLOL SUCCINATE 50 MG/1
50 TABLET, EXTENDED RELEASE ORAL DAILY
Qty: 180 | Refills: 3 | Status: ACTIVE | COMMUNITY
Start: 2019-02-11 | End: 1900-01-01

## 2023-11-06 DIAGNOSIS — E87.1 HYPO-OSMOLALITY AND HYPONATREMIA: ICD-10-CM

## 2023-11-14 ENCOUNTER — RX RENEWAL (OUTPATIENT)
Age: 73
End: 2023-11-14

## 2023-11-14 RX ORDER — BUMETANIDE 2 MG/1
2 TABLET ORAL
Qty: 90 | Refills: 3 | Status: ACTIVE | COMMUNITY
Start: 2022-09-26 | End: 1900-01-01

## 2023-11-15 ENCOUNTER — LABORATORY RESULT (OUTPATIENT)
Age: 73
End: 2023-11-15

## 2023-11-17 LAB
ALBUMIN SERPL ELPH-MCNC: 4.8 G/DL
ALP BLD-CCNC: 69 U/L
ALT SERPL-CCNC: 17 U/L
ANION GAP SERPL CALC-SCNC: 13 MMOL/L
APPEARANCE: CLEAR
AST SERPL-CCNC: 17 U/L
BILIRUB SERPL-MCNC: 0.2 MG/DL
BILIRUBIN URINE: NEGATIVE
BLOOD URINE: ABNORMAL
BUN SERPL-MCNC: 48 MG/DL
CALCIUM SERPL-MCNC: 9.5 MG/DL
CHLORIDE SERPL-SCNC: 102 MMOL/L
CHOLEST SERPL-MCNC: 214 MG/DL
CO2 SERPL-SCNC: 23 MMOL/L
COLOR: YELLOW
CREAT SERPL-MCNC: 2.57 MG/DL
CREAT SPEC-SCNC: 29 MG/DL
CREAT/PROT UR: 0.2 RATIO
EGFR: 19 ML/MIN/1.73M2
ESTIMATED AVERAGE GLUCOSE: 108 MG/DL
GLUCOSE QUALITATIVE U: 100 MG/DL
GLUCOSE SERPL-MCNC: 113 MG/DL
HBA1C MFR BLD HPLC: 5.4 %
HCT VFR BLD CALC: 44.9 %
HDLC SERPL-MCNC: 99 MG/DL
HGB BLD-MCNC: 14.2 G/DL
KETONES URINE: NEGATIVE MG/DL
LDLC SERPL CALC-MCNC: 75 MG/DL
LEUKOCYTE ESTERASE URINE: NEGATIVE
MAGNESIUM SERPL-MCNC: 2.7 MG/DL
MCHC RBC-ENTMCNC: 31.2 PG
MCHC RBC-ENTMCNC: 31.6 GM/DL
MCV RBC AUTO: 98.7 FL
NITRITE URINE: NEGATIVE
NONHDLC SERPL-MCNC: 115 MG/DL
PH URINE: 6
PHOSPHATE SERPL-MCNC: 5 MG/DL
PLATELET # BLD AUTO: 361 K/UL
POTASSIUM SERPL-SCNC: 5.4 MMOL/L
PROT SERPL-MCNC: 7.8 G/DL
PROT UR-MCNC: 6 MG/DL
PROTEIN URINE: NEGATIVE MG/DL
RBC # BLD: 4.55 M/UL
RBC # FLD: 12.1 %
SODIUM SERPL-SCNC: 138 MMOL/L
SPECIFIC GRAVITY URINE: 1.01
TRIGL SERPL-MCNC: 250 MG/DL
UROBILINOGEN URINE: 0.2 MG/DL
WBC # FLD AUTO: 11.17 K/UL

## 2023-12-01 ENCOUNTER — RX RENEWAL (OUTPATIENT)
Age: 73
End: 2023-12-01

## 2023-12-04 ENCOUNTER — RX RENEWAL (OUTPATIENT)
Age: 73
End: 2023-12-04

## 2023-12-04 RX ORDER — SODIUM ZIRCONIUM CYCLOSILICATE 10 G/10G
10 POWDER, FOR SUSPENSION ORAL
Qty: 30 | Refills: 2 | Status: ACTIVE | COMMUNITY
Start: 2019-03-05 | End: 1900-01-01

## 2024-01-05 DIAGNOSIS — E53.8 DEFICIENCY OF OTHER SPECIFIED B GROUP VITAMINS: ICD-10-CM

## 2024-01-05 DIAGNOSIS — R80.9 PROTEINURIA, UNSPECIFIED: ICD-10-CM

## 2024-01-22 LAB
25(OH)D3 SERPL-MCNC: 53.6 NG/ML
ALBUMIN SERPL ELPH-MCNC: 4.7 G/DL
ALP BLD-CCNC: 65 U/L
ALT SERPL-CCNC: 9 U/L
ANION GAP SERPL CALC-SCNC: 13 MMOL/L
AST SERPL-CCNC: 11 U/L
BILIRUB SERPL-MCNC: 0.3 MG/DL
BUN SERPL-MCNC: 36 MG/DL
CALCIUM SERPL-MCNC: 9.5 MG/DL
CHLORIDE SERPL-SCNC: 101 MMOL/L
CHOLEST SERPL-MCNC: 229 MG/DL
CO2 SERPL-SCNC: 24 MMOL/L
CREAT SERPL-MCNC: 2.28 MG/DL
CREAT SPEC-SCNC: 30 MG/DL
CREAT/PROT UR: 0.2 RATIO
EGFR: 22 ML/MIN/1.73M2
GLUCOSE SERPL-MCNC: 97 MG/DL
HCT VFR BLD CALC: 44.1 %
HDLC SERPL-MCNC: 85 MG/DL
HGB BLD-MCNC: 14.1 G/DL
LDLC SERPL CALC-MCNC: 118 MG/DL
MAGNESIUM SERPL-MCNC: 2.7 MG/DL
MCHC RBC-ENTMCNC: 31.3 PG
MCHC RBC-ENTMCNC: 32 GM/DL
MCV RBC AUTO: 97.8 FL
NONHDLC SERPL-MCNC: 145 MG/DL
PHOSPHATE SERPL-MCNC: 4.7 MG/DL
PLATELET # BLD AUTO: 303 K/UL
POTASSIUM SERPL-SCNC: 5.4 MMOL/L
PROT SERPL-MCNC: 7.2 G/DL
PROT UR-MCNC: 6 MG/DL
RBC # BLD: 4.51 M/UL
RBC # FLD: 12.3 %
SODIUM SERPL-SCNC: 138 MMOL/L
TRIGL SERPL-MCNC: 158 MG/DL
WBC # FLD AUTO: 9.53 K/UL

## 2024-02-01 RX ORDER — DAPAGLIFLOZIN 5 MG/1
5 TABLET, FILM COATED ORAL DAILY
Qty: 30 | Refills: 6 | Status: ACTIVE | COMMUNITY
Start: 2023-01-31 | End: 1900-01-01

## 2024-03-20 ENCOUNTER — APPOINTMENT (OUTPATIENT)
Dept: ENDOCRINOLOGY | Facility: CLINIC | Age: 74
End: 2024-03-20
Payer: MEDICARE

## 2024-03-20 VITALS — SYSTOLIC BLOOD PRESSURE: 135 MMHG | DIASTOLIC BLOOD PRESSURE: 87 MMHG | HEART RATE: 70 BPM | OXYGEN SATURATION: 97 %

## 2024-03-20 DIAGNOSIS — M81.0 AGE-RELATED OSTEOPOROSIS W/OUT CURRENT PATHOLOGICAL FRACTURE: ICD-10-CM

## 2024-03-20 DIAGNOSIS — N25.81 SECONDARY HYPERPARATHYROIDISM OF RENAL ORIGIN: ICD-10-CM

## 2024-03-20 DIAGNOSIS — E04.2 NONTOXIC MULTINODULAR GOITER: ICD-10-CM

## 2024-03-20 PROCEDURE — 99214 OFFICE O/P EST MOD 30 MIN: CPT | Mod: 25

## 2024-03-20 PROCEDURE — 96372 THER/PROPH/DIAG INJ SC/IM: CPT

## 2024-03-20 NOTE — PHYSICAL EXAM
[Alert] : alert [No Acute Distress] : no acute distress [Well Developed] : well developed [No Proptosis] : no proptosis [Normal Sclera/Conjunctiva] : normal sclera/conjunctiva [No Respiratory Distress] : no respiratory distress [Normal Hearing] : hearing was normal [No Accessory Muscle Use] : no accessory muscle use [Normal Rate and Effort] : normal respiratory rate and effort [Normal S1, S2] : normal S1 and S2 [Normal Rate] : heart rate was normal [Clear to Auscultation] : lungs were clear to auscultation bilaterally [No Edema] : no peripheral edema [Regular Rhythm] : with a regular rhythm [Not Tender] : non-tender [Soft] : abdomen soft [Not Distended] : not distended [No Stigmata of Cushings Syndrome] : no stigmata of Cushings Syndrome [No Spinal Tenderness] : no spinal tenderness [Scoliosis] : scoliosis present [Oriented x3] : oriented to person, place, and time [Normal Affect] : the affect was normal [de-identified] : imbalance, poor gait [de-identified] : Bilateral nodular goiter left greater than right [de-identified] : mild tremor

## 2024-03-20 NOTE — PROCEDURE
[FreeTextEntry1] : Thyroid ultrasound 3/14/2023\par  Bilateral goiter\par  Markedly enlarged left difficult to assess generally heterogeneous enlargement versus distinct nodules\par  \par  Bone mineral density: 3/2023\par  Compared to outside study 2021\par  Spine: -0.6, stable prior report -0.7\par  L Total hip: -2.1 osteopenia prior report -1.8\par  L Femoral neck: -2.1 osteopenia prior report -2.1\par  1/3 radius: -1.0 normal no prior\par  \par  Thyroid ultrasound August 16, 2022\par  Bilateral goiter\par  Markedly enlarged left difficult to assess generally heterogeneous enlargement versus distinct nodules\par  Right 10 mm X 7 mm X 13 mm nodule\par  \par  Bone mineral density: 1/2021\par  Spine: -0.7 normal\par  L Total hip: -1.8 osteopenia, R -2.0 osteopenia\par  L Femoral neck: -2.1 osteopenia, R -2.2 osteopenia

## 2024-03-20 NOTE — ASSESSMENT
[Denosumab Therapy] : Risks  and benefits of denosumab therapy were discussed with the patient including eczema, cellulitis, osteonecrosis of the jaw and atypical femur fractures [FreeTextEntry1] : 73  year old female w/ osteopenia in the setting of CKD.  Patient meets criteria for osteoporosis due to increased risk of fracture.  Pt states she was told of low bone density after BMD 1/2021.  The bone density meets the criteria for osteopenia but the patient meets the criteria for osteoporosis based on increased risk of future fracture.  Additional risk factors include family history of osteoporosis and hip fracture mother and age.    The patient demonstrates an unsteady gait on physical examination, for unclear reasons.  This does increase the risk of future falls which also increases the risk of fracture.  Previous anorexia which may have contributed to osteoporosis does not currently have an impact independent of  the bone density on future fracture risk.  FRAX shows a 18% risk for 10 year major osteoporotic fracture/ 5.5% risk for 10 year hip fracture. Labs indicated hyperparathyroidism, presumably a secondary hyperparathyroidism of renal insufficiency.  Now off vitamin D. Last Vit D at goal. Pt was taking oxandrolone. Pt stopped due to side effects from medication such as confusion, hallucinations, easy bruising, insomnia, unsteady balance. I doubt these are true side effects of this medication but she can stop it and observe. Patient began Prolia February 2022.  Tolerating well. BMD density stable 3/2023, will continue with Prolia for now, given during office visit today. Has significant gait instability, recommend use of assistive device to prevent falls. Follow up with PCP.   Exam is notable for significant multinodular goiter left greater than right thyroid ultrasound. Patient appears clinically euthyroid. Brought in Thyroid US from 3/2018: mildly enlarged heterogenous thyroid gland, demonstrating multiple b/l solid nodules, similar to previous study. s/p FNA of left dominant nodule with Dr. Song 3/2023. Has been chemically euthyroid.   HTN- c/w metoprolol  HLD- c/w statin  Pharmacological Management of Osteoporosis in Postmenopausal Women: An Endocrine Society  Clinical Practice Guideline. Fang R, Lesa ARGUETA., Kobi DM, Moseley AM, Nando MH, Santo D. JCEM 2019 104: [phone]

## 2024-03-20 NOTE — DATA REVIEWED
[FreeTextEntry1] : Labs 1/2024:  Chol 229 HDL 85  Cr 2.28 Potassium 5.4 Vit D 53.6  Labs 8/2023: Cr 1.96 Alb/Cr 715 Potassium 5.7 CMP otherwise normal  Labs 3/2023: Cr 2.45 Glucose 102 Potassium 5.6 CMP otherwise normal Phos 4.0  Ca 9.6 Vit D 25-OH 87.3 Vit D 1,25 74 TSH 0.68  Labs from 1/2023 Cr: 2.45 GFR: 21 Calcium: 9.5
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
## 2024-03-20 NOTE — REVIEW OF SYSTEMS
[Fatigue] : fatigue [Recent Weight Gain (___ Lbs)] : no recent weight gain [As Noted in HPI] : as noted in HPI [Recent Weight Loss (___ Lbs)] : no recent weight loss [Dysphagia] : no dysphagia [Neck Pain] : no neck pain [Constipation] : constipation [Dysphonia] : no dysphonia [Muscle Weakness] : muscle weakness [Back Pain] : back pain [Difficulty Walking] : difficulty walking [All other systems negative] : All other systems negative [Negative] : Endocrine

## 2024-04-08 DIAGNOSIS — E78.5 HYPERLIPIDEMIA, UNSPECIFIED: ICD-10-CM

## 2024-04-08 DIAGNOSIS — N18.32 CHRONIC KIDNEY DISEASE, STAGE 3B: ICD-10-CM

## 2024-04-08 DIAGNOSIS — E87.5 HYPERKALEMIA: ICD-10-CM

## 2024-04-22 ENCOUNTER — LABORATORY RESULT (OUTPATIENT)
Age: 74
End: 2024-04-22

## 2024-04-30 ENCOUNTER — APPOINTMENT (OUTPATIENT)
Dept: NEPHROLOGY | Facility: CLINIC | Age: 74
End: 2024-04-30
Payer: MEDICARE

## 2024-04-30 VITALS
WEIGHT: 130 LBS | OXYGEN SATURATION: 97 % | HEIGHT: 66 IN | SYSTOLIC BLOOD PRESSURE: 127 MMHG | RESPIRATION RATE: 16 BRPM | BODY MASS INDEX: 20.89 KG/M2 | HEART RATE: 70 BPM | DIASTOLIC BLOOD PRESSURE: 78 MMHG

## 2024-04-30 DIAGNOSIS — M79.7 FIBROMYALGIA: ICD-10-CM

## 2024-04-30 DIAGNOSIS — F32.A ANXIETY DISORDER, UNSPECIFIED: ICD-10-CM

## 2024-04-30 DIAGNOSIS — I10 ESSENTIAL (PRIMARY) HYPERTENSION: ICD-10-CM

## 2024-04-30 DIAGNOSIS — N18.4 CHRONIC KIDNEY DISEASE, STAGE 4 (SEVERE): ICD-10-CM

## 2024-04-30 DIAGNOSIS — F41.9 ANXIETY DISORDER, UNSPECIFIED: ICD-10-CM

## 2024-04-30 PROCEDURE — 99213 OFFICE O/P EST LOW 20 MIN: CPT

## 2024-04-30 NOTE — PHYSICAL EXAM
[General Appearance - Alert] : alert [Sclera] : the sclera and conjunctiva were normal [Outer Ear] : the ears and nose were normal in appearance [Hearing Threshold Finger Rub Not [place]] : hearing was normal [Neck Cervical Mass (___cm)] : no neck mass was observed [Jugular Venous Distention Increased] : there was no jugular-venous distention [Thyroid Diffuse Enlargement] : the thyroid was not enlarged [Auscultation Breath Sounds / Voice Sounds] : lungs were clear to auscultation bilaterally [Heart Sounds] : normal S1 and S2 [Heart Sounds Gallop] : no gallops [Systolic grade ___/6] : A grade [unfilled]/6 systolic murmur was heard. [Edema] : there was no peripheral edema [FreeTextEntry1] : Mild diffuse non specific tenderness on palpation. No masses [Urinary Bladder Findings] : the bladder was normal on palpation [No CVA Tenderness] : no ~M costovertebral angle tenderness [Abnormal Walk] : normal gait [] : no rash [Oriented To Time, Place, And Person] : oriented to person, place, and time

## 2024-04-30 NOTE — ASSESSMENT
[FreeTextEntry1] : We discussed the recent labs.  1.CKD IV. Serum creatinine is stable and the K is 5.3. No proteinuria.  Discussed that currently there is no clinical reason to increase the dose of Farxiga from 5 to 10 mg. The higher dose may carry more side effects. No proteinuria. 2. Hypertension is well controlled. 3. Severe depression. The patient denies suicidal ideation or intention but she is miserable. To discuss ECT w/ psychiatrist this week. 4. Chronic pain syndrome: continue w/ pain management program. Return in 4 to 6 months.

## 2024-04-30 NOTE — HISTORY OF PRESENT ILLNESS
[FreeTextEntry1] : Last seen in August of 2023. The patient had labs done before this visit. The results will be discussed today. Major issue is severe depression.  Crying all the time, has pain in her back and is considering trigger points injection and steroid injections.  Depressed, the worse that she has been in a while. The pain management doctor has suggested ECT. She will discuss w/ her psychiatrist.  Concerned about her kidney disease and ask if the dose of Farxiga should be increased from 5 to 10 mg per day.

## 2024-04-30 NOTE — REASON FOR VISIT
[Follow-Up] : a follow-up visit [Friend] : friend [FreeTextEntry1] : CKD IV from interstitial nephritis. Chronic pain syndrome, fibromyalgia, major depression.

## 2024-06-09 ENCOUNTER — RX RENEWAL (OUTPATIENT)
Age: 74
End: 2024-06-09

## 2024-06-09 RX ORDER — SIMVASTATIN 20 MG/1
20 TABLET, FILM COATED ORAL
Qty: 90 | Refills: 3 | Status: ACTIVE | COMMUNITY
Start: 2021-04-14 | End: 1900-01-01

## 2024-08-20 DIAGNOSIS — N18.32 CHRONIC KIDNEY DISEASE, STAGE 3B: ICD-10-CM

## 2024-08-20 DIAGNOSIS — E53.8 DEFICIENCY OF OTHER SPECIFIED B GROUP VITAMINS: ICD-10-CM

## 2024-08-20 DIAGNOSIS — R79.89 OTHER SPECIFIED ABNORMAL FINDINGS OF BLOOD CHEMISTRY: ICD-10-CM

## 2024-08-23 ENCOUNTER — NON-APPOINTMENT (OUTPATIENT)
Age: 74
End: 2024-08-23

## 2024-08-23 LAB
25(OH)D3 SERPL-MCNC: 48.3 NG/ML
ALBUMIN SERPL ELPH-MCNC: 4.6 G/DL
ALP BLD-CCNC: 54 U/L
ALT SERPL-CCNC: 11 U/L
ANION GAP SERPL CALC-SCNC: 13 MMOL/L
AST SERPL-CCNC: 14 U/L
BILIRUB SERPL-MCNC: 0.4 MG/DL
BUN SERPL-MCNC: 42 MG/DL
CALCIUM SERPL-MCNC: 10.1 MG/DL
CHLORIDE SERPL-SCNC: 99 MMOL/L
CHOLEST SERPL-MCNC: 186 MG/DL
CO2 SERPL-SCNC: 22 MMOL/L
CREAT SERPL-MCNC: 2.58 MG/DL
EGFR: 19 ML/MIN/1.73M2
GLUCOSE SERPL-MCNC: 114 MG/DL
HCT VFR BLD CALC: 40.1 %
HDLC SERPL-MCNC: 75 MG/DL
HGB BLD-MCNC: 12.7 G/DL
LDLC SERPL CALC-MCNC: 83 MG/DL
MAGNESIUM SERPL-MCNC: 2.4 MG/DL
MCHC RBC-ENTMCNC: 31.1 PG
MCHC RBC-ENTMCNC: 31.7 GM/DL
MCV RBC AUTO: 98 FL
NONHDLC SERPL-MCNC: 112 MG/DL
PHOSPHATE SERPL-MCNC: 5.5 MG/DL
PLATELET # BLD AUTO: 282 K/UL
POTASSIUM SERPL-SCNC: 6 MMOL/L
PROT SERPL-MCNC: 7 G/DL
RBC # BLD: 4.09 M/UL
RBC # FLD: 12.8 %
SODIUM SERPL-SCNC: 134 MMOL/L
TRIGL SERPL-MCNC: 169 MG/DL
VIT B12 SERPL-MCNC: 708 PG/ML
WBC # FLD AUTO: 8.4 K/UL

## 2024-08-27 ENCOUNTER — APPOINTMENT (OUTPATIENT)
Dept: NEPHROLOGY | Facility: CLINIC | Age: 74
End: 2024-08-27
Payer: MEDICARE

## 2024-08-27 VITALS
HEIGHT: 66 IN | BODY MASS INDEX: 20.89 KG/M2 | SYSTOLIC BLOOD PRESSURE: 120 MMHG | DIASTOLIC BLOOD PRESSURE: 70 MMHG | RESPIRATION RATE: 16 BRPM | WEIGHT: 130 LBS | OXYGEN SATURATION: 98 % | HEART RATE: 72 BPM

## 2024-08-27 DIAGNOSIS — F41.9 ANXIETY DISORDER, UNSPECIFIED: ICD-10-CM

## 2024-08-27 DIAGNOSIS — E87.5 HYPERKALEMIA: ICD-10-CM

## 2024-08-27 DIAGNOSIS — N18.4 CHRONIC KIDNEY DISEASE, STAGE 4 (SEVERE): ICD-10-CM

## 2024-08-27 DIAGNOSIS — I10 ESSENTIAL (PRIMARY) HYPERTENSION: ICD-10-CM

## 2024-08-27 DIAGNOSIS — F32.A ANXIETY DISORDER, UNSPECIFIED: ICD-10-CM

## 2024-08-27 PROCEDURE — 99213 OFFICE O/P EST LOW 20 MIN: CPT

## 2024-08-27 NOTE — REASON FOR VISIT
[Follow-Up] : a follow-up visit [Family Member] : family member [FreeTextEntry1] : Hyperkalemia, CKD IV, Depression, chronic pain syndrome.

## 2024-08-27 NOTE — ASSESSMENT
[FreeTextEntry1] : 1. Hyperkalemia: Now back on Lokelma. To check renal panel today. May need Lokelma three times per week. 2. Hypertension: controlled. 3. CKD IV stable. 4. Anxiety and Depression: better on Ritalin.  Follow up in 3 months.

## 2024-08-27 NOTE — PHYSICAL EXAM
[General Appearance - Alert] : alert [Sclera] : the sclera and conjunctiva were normal [Outer Ear] : the ears and nose were normal in appearance [Hearing Threshold Finger Rub Not Edd[place]] : hearing was normal [Neck Cervical Mass (___cm)] : no neck mass was observed [Jugular Venous Distention Increased] : there was no jugular-venous distention [Thyroid Diffuse Enlargement] : the thyroid was not enlarged [Auscultation Breath Sounds / Voice Sounds] : lungs were clear to auscultation bilaterally [Heart Sounds] : normal S1 and S2 [Heart Sounds Gallop] : no gallops [Systolic grade ___/6] : A grade [unfilled]/6 systolic murmur was heard. [Edema] : there was no peripheral edema [Urinary Bladder Findings] : the bladder was normal on palpation [Abnormal Walk] : normal gait [] : no rash [No Focal Deficits] : no focal deficits [Oriented To Time, Place, And Person] : oriented to person, place, and time

## 2024-08-27 NOTE — HISTORY OF PRESENT ILLNESS
[FreeTextEntry1] : Had labs done last week. The potassium was 6.0.  The patient had not taken Lokelma for a while.  Now back on Lokelma for the last 4 days.  Feels better since she has been started on Ritalin 10 mg in AM by her psychiatrist.  Constipation as usual. No other issues.

## 2024-08-28 ENCOUNTER — NON-APPOINTMENT (OUTPATIENT)
Age: 74
End: 2024-08-28

## 2024-08-28 LAB
ALBUMIN SERPL ELPH-MCNC: 4.4 G/DL
ANION GAP SERPL CALC-SCNC: 14 MMOL/L
BUN SERPL-MCNC: 48 MG/DL
CALCIUM SERPL-MCNC: 8.7 MG/DL
CHLORIDE SERPL-SCNC: 90 MMOL/L
CO2 SERPL-SCNC: 25 MMOL/L
CREAT SERPL-MCNC: 2.28 MG/DL
EGFR: 22 ML/MIN/1.73M2
GLUCOSE SERPL-MCNC: 117 MG/DL
PHOSPHATE SERPL-MCNC: 3.7 MG/DL
POTASSIUM SERPL-SCNC: 3.8 MMOL/L
SODIUM SERPL-SCNC: 128 MMOL/L

## 2024-09-05 ENCOUNTER — APPOINTMENT (OUTPATIENT)
Dept: NEUROLOGY | Facility: CLINIC | Age: 74
End: 2024-09-05
Payer: MEDICARE

## 2024-09-05 VITALS
WEIGHT: 130 LBS | BODY MASS INDEX: 20.89 KG/M2 | DIASTOLIC BLOOD PRESSURE: 82 MMHG | HEIGHT: 66 IN | HEART RATE: 66 BPM | SYSTOLIC BLOOD PRESSURE: 147 MMHG

## 2024-09-05 DIAGNOSIS — G31.84 MILD COGNITIVE IMPAIRMENT, SO STATED: ICD-10-CM

## 2024-09-05 PROCEDURE — 99214 OFFICE O/P EST MOD 30 MIN: CPT

## 2024-09-05 NOTE — CONSULT LETTER
[Dear  ___] : Dear  [unfilled], [Consult Letter:] : I had the pleasure of evaluating your patient, [unfilled]. [Please see my note below.] : Please see my note below. [Consult Closing:] : Thank you very much for allowing me to participate in the care of this patient.  If you have any questions, please do not hesitate to contact me. [Sincerely,] : Sincerely, [FreeTextEntry3] : Terry Blackmon M.D., Ph.D. DPN-N [facility] at [place] Director, Division of Neurology Director, Comprehensive Stroke Center [facility]

## 2024-09-05 NOTE — HISTORY OF PRESENT ILLNESS
[FreeTextEntry1] : Initial office visit September 5, 2024: This is a 74-year-old woman who presents today for evaluation of confusion.  She has had confusion for several years but its gotten worse over the past 2 years.  Of note she has severe anxiety and depression and is on multiple psychiatric medication.  She also has chronic kidney disease, stage IV.  She has some balance and issues as well as a tremor.  In the past she has been evaluated by neurologist who thought that this may represent side effects of medication as well as decreased excretion of medicines due to her kidney disease.  She has been told by her kidney doctor that she has brain fog likely due to her kidney failure.  Of note she was recently started on low-dose of Ritalin by her psychiatrist and feels that her symptoms are mildly improved.  She is here today for neurologic evaluation.

## 2024-09-05 NOTE — REVIEW OF SYSTEMS
[As Noted in HPI] : as noted in HPI [Confused or Disoriented] : confusion [Memory Lapses or Loss] : memory loss [Decr. Concentrating Ability] : decreased concentrating ability [Repeating Questions] : repeated questioning about recent events [Negative] : Heme/Lymph

## 2024-09-05 NOTE — ASSESSMENT
[FreeTextEntry1] : This is a 74-year-old woman with mild cognitive impairment.  This is likely an due to combination of medication effect chronic kidney disease as well as her underlying anxiety and depression causing her to ruminate on things and not pay attention and focus in the moment.  At this point she does not appear to have an exam consistent with a diagnosis of dementia.  I suggested that she follow-up with her psychiatrist to adjust medications accordingly to better control her anxiety.  The tremor that she has is possibly due to medication or exacerbated by the anxiety she has.  I will see her back in the office on an as-needed basis.

## 2024-09-05 NOTE — PHYSICAL EXAM
[General Appearance - Alert] : alert [General Appearance - In No Acute Distress] : in no acute distress [General Appearance - Well Nourished] : well nourished [General Appearance - Well Developed] : well developed [Person] : oriented to person [Place] : oriented to place [Time] : oriented to time [Short Term Intact] : short term memory intact [Remote Intact] : remote memory intact [Registration Intact] : recent registration memory intact [Span Intact] : the attention span was normal [Concentration Intact] : normal concentrating ability [Visual Intact] : visual attention was ~T not ~L decreased [Naming Objects] : no difficulty naming common objects [Repeating Phrases] : no difficulty repeating a phrase [Fluency] : fluency intact [Comprehension] : comprehension intact [Past History] : adequate knowledge of personal past history [Cranial Nerves Optic (II)] : visual acuity intact bilaterally,  visual fields full to confrontation, pupils equal round and reactive to light [Cranial Nerves Trigeminal (V)] : facial sensation intact symmetrically [Cranial Nerves Oculomotor (III)] : extraocular motion intact [Cranial Nerves Facial (VII)] : face symmetrical [Cranial Nerves Vestibulocochlear (VIII)] : hearing was intact bilaterally [Cranial Nerves Glossopharyngeal (IX)] : tongue and palate midline [Cranial Nerves Accessory (XI - Cranial And Spinal)] : head turning and shoulder shrug symmetric [Cranial Nerves Hypoglossal (XII)] : there was no tongue deviation with protrusion [Motor Tone] : muscle tone was normal in all four extremities [No Muscle Atrophy] : normal bulk in all four extremities [Motor Strength] : muscle strength was normal in all four extremities [Paresis Pronator Drift Right-Sided] : no pronator drift on the right [Paresis Pronator Drift Left-Sided] : no pronator drift on the left [Motor Strength Upper Extremities Bilaterally] : strength was normal in both upper extremities [Motor Strength Lower Extremities Bilaterally] : strength was normal in both lower extremities [Sensation Tactile Decrease] : light touch was intact [Sensation Pain / Temperature Decrease] : pain and temperature was intact [Sensation Vibration Decrease] : vibration was intact [Proprioception] : proprioception was intact [Abnormal Walk] : normal gait [Balance] : balance was intact [Tremor] : a tremor present [Coordination - Dysmetria Impaired Finger-to-Nose Bilateral] : not present [2+] : Patella left 2+ [FreeTextEntry4] : 3 out of 3 recall [Sclera] : the sclera and conjunctiva were normal [PERRL With Normal Accommodation] : pupils were equal in size, round, reactive to light, with normal accommodation [Extraocular Movements] : extraocular movements were intact [No APD] : no afferent pupillary defect [No TIEN] : no internuclear ophthalmoplegia [Full Visual Field] : full visual field

## 2024-09-14 ENCOUNTER — NON-APPOINTMENT (OUTPATIENT)
Age: 74
End: 2024-09-14

## 2024-09-14 ENCOUNTER — LABORATORY RESULT (OUTPATIENT)
Age: 74
End: 2024-09-14

## 2024-09-14 ENCOUNTER — APPOINTMENT (OUTPATIENT)
Dept: RADIOLOGY | Facility: CLINIC | Age: 74
End: 2024-09-14
Payer: MEDICARE

## 2024-09-16 PROCEDURE — 77080 DXA BONE DENSITY AXIAL: CPT

## 2024-09-17 DIAGNOSIS — N18.4 CHRONIC KIDNEY DISEASE, STAGE 4 (SEVERE): ICD-10-CM

## 2024-09-17 DIAGNOSIS — E87.1 HYPO-OSMOLALITY AND HYPONATREMIA: ICD-10-CM

## 2024-09-20 ENCOUNTER — RX RENEWAL (OUTPATIENT)
Age: 74
End: 2024-09-20

## 2024-09-23 ENCOUNTER — APPOINTMENT (OUTPATIENT)
Dept: ENDOCRINOLOGY | Facility: CLINIC | Age: 74
End: 2024-09-23
Payer: MEDICARE

## 2024-09-23 VITALS
DIASTOLIC BLOOD PRESSURE: 80 MMHG | SYSTOLIC BLOOD PRESSURE: 154 MMHG | HEART RATE: 68 BPM | HEIGHT: 66 IN | OXYGEN SATURATION: 94 %

## 2024-09-23 DIAGNOSIS — E04.2 NONTOXIC MULTINODULAR GOITER: ICD-10-CM

## 2024-09-23 DIAGNOSIS — I10 ESSENTIAL (PRIMARY) HYPERTENSION: ICD-10-CM

## 2024-09-23 DIAGNOSIS — E78.5 HYPERLIPIDEMIA, UNSPECIFIED: ICD-10-CM

## 2024-09-23 DIAGNOSIS — M81.0 AGE-RELATED OSTEOPOROSIS W/OUT CURRENT PATHOLOGICAL FRACTURE: ICD-10-CM

## 2024-09-23 PROCEDURE — 96372 THER/PROPH/DIAG INJ SC/IM: CPT

## 2024-09-23 PROCEDURE — 99214 OFFICE O/P EST MOD 30 MIN: CPT | Mod: 25

## 2024-09-23 NOTE — PHYSICAL EXAM
[Alert] : alert [No Acute Distress] : no acute distress [Well Developed] : well developed [Normal Sclera/Conjunctiva] : normal sclera/conjunctiva [No Proptosis] : no proptosis [Normal Hearing] : hearing was normal [No Respiratory Distress] : no respiratory distress [No Accessory Muscle Use] : no accessory muscle use [Normal Rate and Effort] : normal respiratory rate and effort [Clear to Auscultation] : lungs were clear to auscultation bilaterally [Normal S1, S2] : normal S1 and S2 [Normal Rate] : heart rate was normal [Regular Rhythm] : with a regular rhythm [No Edema] : no peripheral edema [Not Tender] : non-tender [Not Distended] : not distended [Soft] : abdomen soft [No Spinal Tenderness] : no spinal tenderness [Scoliosis] : scoliosis present [No Stigmata of Cushings Syndrome] : no stigmata of Cushings Syndrome [Oriented x3] : oriented to person, place, and time [Normal Affect] : the affect was normal [de-identified] : Bilateral nodular goiter left greater than right [de-identified] : imbalance, poor gait [de-identified] : mild tremor

## 2024-09-23 NOTE — DATA REVIEWED
[FreeTextEntry1] : Labs 9/2024: Cr 2.52 K 6.6  Labs 8/2024: Sodium 134 K 6.0 Cr 2.58 Glucose 114 Vit D 48.3  Labs 1/2024:  Chol 229 HDL 85  Cr 2.28 Potassium 5.4 Vit D 53.6  Labs 8/2023: Cr 1.96 Alb/Cr 715 Potassium 5.7 CMP otherwise normal  Labs 3/2023: Cr 2.45 Glucose 102 Potassium 5.6 CMP otherwise normal Phos 4.0  Ca 9.6 Vit D 25-OH 87.3 Vit D 1,25 74 TSH 0.68  Labs from 1/2023 Cr: 2.45 GFR: 21 Calcium: 9.5
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
## 2024-09-23 NOTE — ASSESSMENT
[Denosumab Therapy] : Risks  and benefits of denosumab therapy were discussed with the patient including eczema, cellulitis, osteonecrosis of the jaw and atypical femur fractures [FreeTextEntry1] : 74 year old female w/ osteopenia in the setting of CKD.  Patient meets criteria for osteoporosis due to increased risk of fracture.  Pt states she was told of low bone density after BMD 1/2021.  The bone density meets the criteria for osteopenia but the patient meets the criteria for osteoporosis based on increased risk of future fracture.  Additional risk factors include family history of osteoporosis and hip fracture mother and age.    The patient demonstrates an unsteady gait on physical examination, for unclear reasons.  This does increase the risk of future falls which also increases the risk of fracture.  Previous anorexia which may have contributed to osteoporosis does not currently have an impact independent of  the bone density on future fracture risk.  FRAX shows a 18% risk for 10 year major osteoporotic fracture/ 5.5% risk for 10 year hip fracture. Labs indicated hyperparathyroidism, presumably a secondary hyperparathyroidism of renal insufficiency.  Now off vitamin D. Last Vit D at goal. Pt was taking oxandrolone. Pt stopped due to side effects from medication such as confusion, hallucinations, easy bruising, insomnia, unsteady balance. I doubt these are true side effects of this medication but she can stop it and observe. Patient began Prolia February 2022.  Tolerating well. BMD density stable 3/2023 and 9/2024, will continue with Prolia for now, given during office visit today. Has significant gait instability, recommend use of assistive device to prevent falls. Follow up with PCP.   Exam is notable for significant multinodular goiter left greater than right thyroid ultrasound. Patient appears clinically euthyroid. Brought in Thyroid US from 3/2018: mildly enlarged heterogenous thyroid gland, demonstrating multiple b/l solid nodules, similar to previous study. s/p FNA of left dominant nodule with Dr. Song 3/2023. Has been chemically euthyroid. Repeat Thyroid US this year.   HTN- c/w metoprolol  HLD- c/w statin  Pharmacological Management of Osteoporosis in Postmenopausal Women: An Endocrine Society  Clinical Practice Guideline. Fang R, Lesa ARGUETA., Kobi DM, Lorelei AM, Nando MH, Santo D. JCEM 2019 104: [phone]

## 2024-09-23 NOTE — PAST MEDICAL HISTORY
[Menarche Age ____] : age at menarche was [unfilled] [Menopause Age____] : age at menopause was [unfilled] [History of Hormone Replacement Treatment] : has no history of hormone replacement treatment Patient/Caregiver provided printed discharge information.

## 2024-09-23 NOTE — REVIEW OF SYSTEMS
[Fatigue] : fatigue [As Noted in HPI] : as noted in HPI [Constipation] : constipation [Muscle Weakness] : muscle weakness [Back Pain] : back pain [Difficulty Walking] : difficulty walking [Negative] : Endocrine [All other systems negative] : All other systems negative [Recent Weight Gain (___ Lbs)] : no recent weight gain [Recent Weight Loss (___ Lbs)] : no recent weight loss [Dysphagia] : no dysphagia [Neck Pain] : no neck pain [Dysphonia] : no dysphonia

## 2024-09-24 LAB
ALBUMIN SERPL ELPH-MCNC: 4.7 G/DL
ALP BLD-CCNC: 60 U/L
ALT SERPL-CCNC: 9 U/L
ANION GAP SERPL CALC-SCNC: 18 MMOL/L
AST SERPL-CCNC: 12 U/L
BILIRUB SERPL-MCNC: 0.3 MG/DL
BUN SERPL-MCNC: 55 MG/DL
CALCIUM SERPL-MCNC: 9.9 MG/DL
CHLORIDE SERPL-SCNC: 100 MMOL/L
CO2 SERPL-SCNC: 21 MMOL/L
CREAT SERPL-MCNC: 2.47 MG/DL
EGFR: 20 ML/MIN/1.73M2
GLUCOSE SERPL-MCNC: 96 MG/DL
HCT VFR BLD CALC: 42.6 %
HGB BLD-MCNC: 13.4 G/DL
MCHC RBC-ENTMCNC: 31.5 GM/DL
MCHC RBC-ENTMCNC: 31.5 PG
MCV RBC AUTO: 100 FL
PLATELET # BLD AUTO: 336 K/UL
POTASSIUM SERPL-SCNC: 5.9 MMOL/L
PROT SERPL-MCNC: 7.5 G/DL
RBC # BLD: 4.26 M/UL
RBC # FLD: 12.8 %
SODIUM SERPL-SCNC: 138 MMOL/L
WBC # FLD AUTO: 10.13 K/UL

## 2024-10-14 DIAGNOSIS — N18.4 CHRONIC KIDNEY DISEASE, STAGE 4 (SEVERE): ICD-10-CM

## 2024-10-14 DIAGNOSIS — E87.1 HYPO-OSMOLALITY AND HYPONATREMIA: ICD-10-CM

## 2024-10-24 LAB
ALBUMIN SERPL ELPH-MCNC: 4.4 G/DL
ANION GAP SERPL CALC-SCNC: 13 MMOL/L
BUN SERPL-MCNC: 54 MG/DL
CALCIUM SERPL-MCNC: 9.5 MG/DL
CHLORIDE SERPL-SCNC: 104 MMOL/L
CO2 SERPL-SCNC: 22 MMOL/L
CREAT SERPL-MCNC: 2.39 MG/DL
EGFR: 21 ML/MIN/1.73M2
GLUCOSE SERPL-MCNC: 97 MG/DL
PHOSPHATE SERPL-MCNC: 5 MG/DL
POTASSIUM SERPL-SCNC: 5 MMOL/L
SODIUM SERPL-SCNC: 138 MMOL/L

## 2024-12-05 ENCOUNTER — RX RENEWAL (OUTPATIENT)
Age: 74
End: 2024-12-05

## 2024-12-09 ENCOUNTER — RX RENEWAL (OUTPATIENT)
Age: 74
End: 2024-12-09

## 2024-12-27 ENCOUNTER — APPOINTMENT (OUTPATIENT)
Dept: NEPHROLOGY | Facility: CLINIC | Age: 74
End: 2024-12-27
Payer: MEDICARE

## 2024-12-27 VITALS
BODY MASS INDEX: 20.73 KG/M2 | SYSTOLIC BLOOD PRESSURE: 122 MMHG | HEIGHT: 66 IN | OXYGEN SATURATION: 95 % | RESPIRATION RATE: 16 BRPM | DIASTOLIC BLOOD PRESSURE: 76 MMHG | HEART RATE: 74 BPM | WEIGHT: 129 LBS

## 2024-12-27 DIAGNOSIS — E87.5 HYPERKALEMIA: ICD-10-CM

## 2024-12-27 DIAGNOSIS — R31.29 OTHER MICROSCOPIC HEMATURIA: ICD-10-CM

## 2024-12-27 DIAGNOSIS — F32.A ANXIETY DISORDER, UNSPECIFIED: ICD-10-CM

## 2024-12-27 DIAGNOSIS — F41.9 ANXIETY DISORDER, UNSPECIFIED: ICD-10-CM

## 2024-12-27 DIAGNOSIS — I10 ESSENTIAL (PRIMARY) HYPERTENSION: ICD-10-CM

## 2024-12-27 DIAGNOSIS — R79.89 OTHER SPECIFIED ABNORMAL FINDINGS OF BLOOD CHEMISTRY: ICD-10-CM

## 2024-12-27 DIAGNOSIS — N18.4 CHRONIC KIDNEY DISEASE, STAGE 4 (SEVERE): ICD-10-CM

## 2024-12-27 PROCEDURE — 99213 OFFICE O/P EST LOW 20 MIN: CPT

## 2025-01-02 ENCOUNTER — LABORATORY RESULT (OUTPATIENT)
Age: 75
End: 2025-01-02

## 2025-01-06 ENCOUNTER — NON-APPOINTMENT (OUTPATIENT)
Age: 75
End: 2025-01-06

## 2025-01-15 ENCOUNTER — APPOINTMENT (OUTPATIENT)
Dept: TRANSPLANT | Facility: CLINIC | Age: 75
End: 2025-01-15

## 2025-01-15 ENCOUNTER — APPOINTMENT (OUTPATIENT)
Dept: NEPHROLOGY | Facility: CLINIC | Age: 75
End: 2025-01-15

## 2025-02-04 ENCOUNTER — RX RENEWAL (OUTPATIENT)
Age: 75
End: 2025-02-04

## 2025-02-04 DIAGNOSIS — R80.9 PROTEINURIA, UNSPECIFIED: ICD-10-CM

## 2025-02-04 DIAGNOSIS — M85.80 OTHER SPECIFIED DISORDERS OF BONE DENSITY AND STRUCTURE, UNSPECIFIED SITE: ICD-10-CM

## 2025-02-04 DIAGNOSIS — E87.1 HYPO-OSMOLALITY AND HYPONATREMIA: ICD-10-CM

## 2025-02-04 DIAGNOSIS — R79.89 OTHER SPECIFIED ABNORMAL FINDINGS OF BLOOD CHEMISTRY: ICD-10-CM

## 2025-02-04 DIAGNOSIS — E78.5 HYPERLIPIDEMIA, UNSPECIFIED: ICD-10-CM

## 2025-02-12 LAB
ALBUMIN SERPL ELPH-MCNC: 4.5 G/DL
ALP BLD-CCNC: 59 U/L
ALT SERPL-CCNC: 10 U/L
ANION GAP SERPL CALC-SCNC: 12 MMOL/L
AST SERPL-CCNC: 13 U/L
BILIRUB SERPL-MCNC: 0.3 MG/DL
BUN SERPL-MCNC: 41 MG/DL
CALCIUM SERPL-MCNC: 9.3 MG/DL
CHLORIDE SERPL-SCNC: 96 MMOL/L
CO2 SERPL-SCNC: 24 MMOL/L
CREAT SERPL-MCNC: 2.3 MG/DL
EGFR: 22 ML/MIN/1.73M2
GLUCOSE SERPL-MCNC: 107 MG/DL
HCT VFR BLD CALC: 41.5 %
HGB BLD-MCNC: 13.3 G/DL
IRON SATN MFR SERPL: 40 %
IRON SERPL-MCNC: 117 UG/DL
MCHC RBC-ENTMCNC: 31.5 PG
MCHC RBC-ENTMCNC: 32 G/DL
MCV RBC AUTO: 98.3 FL
PLATELET # BLD AUTO: 315 K/UL
POTASSIUM SERPL-SCNC: 4.6 MMOL/L
PROT SERPL-MCNC: 7.2 G/DL
RBC # BLD: 4.22 M/UL
RBC # FLD: 13.2 %
SODIUM SERPL-SCNC: 132 MMOL/L
TIBC SERPL-MCNC: 291 UG/DL
UIBC SERPL-MCNC: 174 UG/DL
WBC # FLD AUTO: 8.42 K/UL

## 2025-02-14 ENCOUNTER — APPOINTMENT (OUTPATIENT)
Dept: NEPHROLOGY | Facility: CLINIC | Age: 75
End: 2025-02-14
Payer: MEDICARE

## 2025-02-14 VITALS
BODY MASS INDEX: 20.89 KG/M2 | WEIGHT: 130 LBS | RESPIRATION RATE: 16 BRPM | DIASTOLIC BLOOD PRESSURE: 74 MMHG | SYSTOLIC BLOOD PRESSURE: 124 MMHG | HEIGHT: 66 IN | HEART RATE: 78 BPM | OXYGEN SATURATION: 98 %

## 2025-02-14 DIAGNOSIS — E87.5 HYPERKALEMIA: ICD-10-CM

## 2025-02-14 DIAGNOSIS — F41.9 ANXIETY DISORDER, UNSPECIFIED: ICD-10-CM

## 2025-02-14 DIAGNOSIS — I10 ESSENTIAL (PRIMARY) HYPERTENSION: ICD-10-CM

## 2025-02-14 DIAGNOSIS — F32.A ANXIETY DISORDER, UNSPECIFIED: ICD-10-CM

## 2025-02-14 DIAGNOSIS — M79.7 FIBROMYALGIA: ICD-10-CM

## 2025-02-14 DIAGNOSIS — N18.4 CHRONIC KIDNEY DISEASE, STAGE 4 (SEVERE): ICD-10-CM

## 2025-02-14 PROCEDURE — 99213 OFFICE O/P EST LOW 20 MIN: CPT

## 2025-02-19 ENCOUNTER — APPOINTMENT (OUTPATIENT)
Dept: TRANSPLANT | Facility: CLINIC | Age: 75
End: 2025-02-19

## 2025-02-19 ENCOUNTER — APPOINTMENT (OUTPATIENT)
Dept: NEPHROLOGY | Facility: CLINIC | Age: 75
End: 2025-02-19

## 2025-02-20 DIAGNOSIS — K59.09 OTHER CONSTIPATION: ICD-10-CM

## 2025-02-20 RX ORDER — LINACLOTIDE 145 UG/1
145 CAPSULE, GELATIN COATED ORAL
Qty: 90 | Refills: 3 | Status: ACTIVE | COMMUNITY
Start: 2025-02-20 | End: 1900-01-01

## 2025-04-01 ENCOUNTER — APPOINTMENT (OUTPATIENT)
Dept: ENDOCRINOLOGY | Facility: CLINIC | Age: 75
End: 2025-04-01
Payer: MEDICARE

## 2025-04-01 PROCEDURE — 96372 THER/PROPH/DIAG INJ SC/IM: CPT

## 2025-04-01 RX ORDER — DENOSUMAB 60 MG/ML
60 INJECTION SUBCUTANEOUS
Qty: 1 | Refills: 0 | Status: COMPLETED | OUTPATIENT
Start: 2025-03-31

## 2025-04-11 ENCOUNTER — APPOINTMENT (OUTPATIENT)
Dept: NEPHROLOGY | Facility: CLINIC | Age: 75
End: 2025-04-11

## 2025-04-15 ENCOUNTER — APPOINTMENT (OUTPATIENT)
Dept: TRANSPLANT | Facility: CLINIC | Age: 75
End: 2025-04-15

## 2025-04-15 ENCOUNTER — APPOINTMENT (OUTPATIENT)
Dept: NEPHROLOGY | Facility: CLINIC | Age: 75
End: 2025-04-15
Payer: COMMERCIAL

## 2025-04-15 VITALS
BODY MASS INDEX: 19.77 KG/M2 | TEMPERATURE: 97.6 F | OXYGEN SATURATION: 98 % | WEIGHT: 123 LBS | HEIGHT: 66 IN | HEART RATE: 66 BPM | SYSTOLIC BLOOD PRESSURE: 132 MMHG | DIASTOLIC BLOOD PRESSURE: 82 MMHG

## 2025-04-15 DIAGNOSIS — Z01.818 ENCOUNTER FOR OTHER PREPROCEDURAL EXAMINATION: ICD-10-CM

## 2025-04-15 PROCEDURE — 99215 OFFICE O/P EST HI 40 MIN: CPT

## 2025-04-15 PROCEDURE — 99205 OFFICE O/P NEW HI 60 MIN: CPT

## 2025-04-16 ENCOUNTER — NON-APPOINTMENT (OUTPATIENT)
Age: 75
End: 2025-04-16

## 2025-04-16 LAB
ABORH: NORMAL
ALBUMIN SERPL ELPH-MCNC: 4.8 G/DL
ALP BLD-CCNC: 70 U/L
ALT SERPL-CCNC: 6 U/L
ANION GAP SERPL CALC-SCNC: 13 MMOL/L
AST SERPL-CCNC: 12 U/L
BASOPHILS # BLD AUTO: 0.06 K/UL
BASOPHILS NFR BLD AUTO: 0.6 %
BILIRUB SERPL-MCNC: 0.3 MG/DL
BUN SERPL-MCNC: 41 MG/DL
C PEPTIDE SERPL-MCNC: 4.5 NG/ML
CALCIUM SERPL-MCNC: 9 MG/DL
CALCIUM SERPL-MCNC: 9 MG/DL
CHLORIDE SERPL-SCNC: 96 MMOL/L
CHOLEST SERPL-MCNC: 216 MG/DL
CMV IGG SERPL QL: <0.2 U/ML
CMV IGG SERPL-IMP: NEGATIVE
CO2 SERPL-SCNC: 23 MMOL/L
COVID-19 NUCLEOCAPSID  GAM ANTIBODY INTERPRETATION: POSITIVE
COVID-19 SPIKE DOMAIN ANTIBODY INTERPRETATION: POSITIVE
CREAT SERPL-MCNC: 2.44 MG/DL
EBV EA AB SER IA-ACNC: 5.33 U/ML
EBV EA AB TITR SER IF: POSITIVE
EBV EA IGG SER QL IA: 167 U/ML
EBV EA IGG SER-ACNC: NEGATIVE
EBV EA IGM SER IA-ACNC: NEGATIVE
EBV PATRN SPEC IB-IMP: NORMAL
EBV VCA IGG SER IA-ACNC: >750 U/ML
EBV VCA IGM SER QL IA: 24.1 U/ML
EGFRCR SERPLBLD CKD-EPI 2021: 20 ML/MIN/1.73M2
EOSINOPHIL # BLD AUTO: 0.17 K/UL
EOSINOPHIL NFR BLD AUTO: 1.8 %
EPSTEIN-BARR VIRUS CAPSID ANTIGEN IGG: POSITIVE
ESTIMATED AVERAGE GLUCOSE: 108 MG/DL
GLUCOSE SERPL-MCNC: 102 MG/DL
HBA1C MFR BLD HPLC: 5.4 %
HBV CORE IGG+IGM SER QL: NONREACTIVE
HBV SURFACE AB SER QL: NONREACTIVE
HBV SURFACE AB SERPL IA-ACNC: <3.3 MIU/ML
HBV SURFACE AG SER QL: NONREACTIVE
HCG SERPL-MCNC: 7 MIU/ML
HCT VFR BLD CALC: 40 %
HCV AB SER QL: NONREACTIVE
HCV S/CO RATIO: 0.22 S/CO
HDLC SERPL-MCNC: 95 MG/DL
HEPATITIS A IGG ANTIBODY: NONREACTIVE
HGB BLD-MCNC: 13 G/DL
HIV1+2 AB SPEC QL IA.RAPID: NONREACTIVE
HSV 1+2 IGG SER IA-IMP: NEGATIVE
HSV 1+2 IGG SER IA-IMP: POSITIVE
HSV1 IGG SER QL: 8.43 INDEX
HSV2 IGG SER QL: 0.03 INDEX
IMM GRANULOCYTES NFR BLD AUTO: 0.5 %
LDLC SERPL-MCNC: 97 MG/DL
LYMPHOCYTES # BLD AUTO: 2.86 K/UL
LYMPHOCYTES NFR BLD AUTO: 30.2 %
MAGNESIUM SERPL-MCNC: 2.6 MG/DL
MAN DIFF?: NORMAL
MCHC RBC-ENTMCNC: 31.8 PG
MCHC RBC-ENTMCNC: 32.5 G/DL
MCV RBC AUTO: 97.8 FL
MEV IGG FLD QL IA: >300 AU/ML
MEV IGG FLD QL IA: >300 AU/ML
MEV IGG+IGM SER-IMP: POSITIVE
MEV IGG+IGM SER-IMP: POSITIVE
MONOCYTES # BLD AUTO: 0.84 K/UL
MONOCYTES NFR BLD AUTO: 8.9 %
MUV AB SER-ACNC: POSITIVE
MUV AB SER-ACNC: POSITIVE
MUV IGG SER QL IA: 116 AU/ML
MUV IGG SER QL IA: 116 AU/ML
NEUTROPHILS # BLD AUTO: 5.48 K/UL
NEUTROPHILS NFR BLD AUTO: 58 %
NONHDLC SERPL-MCNC: 121 MG/DL
PARATHYROID HORMONE INTACT: 202 PG/ML
PHOSPHATE SERPL-MCNC: 4.1 MG/DL
PLATELET # BLD AUTO: 276 K/UL
POTASSIUM SERPL-SCNC: 4.2 MMOL/L
PROT SERPL-MCNC: 7.4 G/DL
RBC # BLD: 4.09 M/UL
RBC # FLD: 12.3 %
RUBV IGG FLD-ACNC: 28.2 INDEX
RUBV IGG SER-IMP: POSITIVE
SARS-COV-2 AB SERPL IA-ACNC: >250 U/ML
SARS-COV-2 AB SERPL QL IA: 9.23 INDEX
SARS-COV-2 N GENE NPH QL NAA+PROBE: NOT DETECTED
SODIUM SERPL-SCNC: 132 MMOL/L
T GONDII AB SER-IMP: NEGATIVE
T GONDII IGG SER QL: <3 IU/ML
T PALLIDUM AB SER QL IA: NEGATIVE
TRIGL SERPL-MCNC: 143 MG/DL
VZV AB TITR SER: POSITIVE
VZV AB TITR SER: POSITIVE
VZV IGG SER IF-ACNC: 14.4 S/CO
VZV IGG SER IF-ACNC: 14.4 S/CO
WBC # FLD AUTO: 9.46 K/UL

## 2025-04-17 LAB
EBV DNA SERPL NAA+PROBE-ACNC: NOT DETECTED IU/ML
EBVPCR LOG: NOT DETECTED LOG10IU/ML
M TB IFN-G BLD-IMP: NEGATIVE
QUANTIFERON TB PLUS MITOGEN MINUS NIL: >10 IU/ML
QUANTIFERON TB PLUS NIL: 0.04 IU/ML
QUANTIFERON TB PLUS TB1 MINUS NIL: 0 IU/ML
QUANTIFERON TB PLUS TB2 MINUS NIL: 0 IU/ML

## 2025-04-22 ENCOUNTER — APPOINTMENT (OUTPATIENT)
Dept: OBGYN | Facility: CLINIC | Age: 75
End: 2025-04-22

## 2025-04-22 LAB — STRONGYLOIDES AB SER IA-ACNC: POSITIVE

## 2025-04-28 ENCOUNTER — APPOINTMENT (OUTPATIENT)
Dept: CARDIOLOGY | Facility: CLINIC | Age: 75
End: 2025-04-28

## 2025-04-28 ENCOUNTER — NON-APPOINTMENT (OUTPATIENT)
Age: 75
End: 2025-04-28

## 2025-04-28 VITALS
OXYGEN SATURATION: 98 % | HEART RATE: 63 BPM | DIASTOLIC BLOOD PRESSURE: 78 MMHG | SYSTOLIC BLOOD PRESSURE: 124 MMHG | BODY MASS INDEX: 20.01 KG/M2 | WEIGHT: 124 LBS

## 2025-04-28 DIAGNOSIS — Z01.818 ENCOUNTER FOR OTHER PREPROCEDURAL EXAMINATION: ICD-10-CM

## 2025-04-28 DIAGNOSIS — E78.5 HYPERLIPIDEMIA, UNSPECIFIED: ICD-10-CM

## 2025-04-28 DIAGNOSIS — I10 ESSENTIAL (PRIMARY) HYPERTENSION: ICD-10-CM

## 2025-04-28 PROCEDURE — 99215 OFFICE O/P EST HI 40 MIN: CPT

## 2025-04-28 PROCEDURE — 93000 ELECTROCARDIOGRAM COMPLETE: CPT | Mod: NC

## 2025-04-28 PROCEDURE — G2211 COMPLEX E/M VISIT ADD ON: CPT

## 2025-05-02 ENCOUNTER — APPOINTMENT (OUTPATIENT)
Dept: CT IMAGING | Facility: CLINIC | Age: 75
End: 2025-05-02

## 2025-05-02 ENCOUNTER — APPOINTMENT (OUTPATIENT)
Dept: ULTRASOUND IMAGING | Facility: CLINIC | Age: 75
End: 2025-05-02

## 2025-05-02 ENCOUNTER — APPOINTMENT (OUTPATIENT)
Dept: MAMMOGRAPHY | Facility: CLINIC | Age: 75
End: 2025-05-02

## 2025-05-06 ENCOUNTER — NON-APPOINTMENT (OUTPATIENT)
Age: 75
End: 2025-05-06

## 2025-05-10 ENCOUNTER — NON-APPOINTMENT (OUTPATIENT)
Age: 75
End: 2025-05-10

## 2025-06-10 ENCOUNTER — APPOINTMENT (OUTPATIENT)
Dept: NEPHROLOGY | Facility: CLINIC | Age: 75
End: 2025-06-10
Payer: MEDICARE

## 2025-06-10 VITALS
BODY MASS INDEX: 19.61 KG/M2 | HEART RATE: 76 BPM | WEIGHT: 122 LBS | DIASTOLIC BLOOD PRESSURE: 74 MMHG | OXYGEN SATURATION: 98 % | SYSTOLIC BLOOD PRESSURE: 118 MMHG | RESPIRATION RATE: 16 BRPM | HEIGHT: 66 IN

## 2025-06-10 PROCEDURE — 99213 OFFICE O/P EST LOW 20 MIN: CPT

## 2025-06-12 LAB
ALBUMIN SERPL ELPH-MCNC: 4.4 G/DL
ALP BLD-CCNC: 79 U/L
ALT SERPL-CCNC: 9 U/L
ANION GAP SERPL CALC-SCNC: 18 MMOL/L
AST SERPL-CCNC: 11 U/L
BILIRUB SERPL-MCNC: 0.2 MG/DL
BUN SERPL-MCNC: 41 MG/DL
CALCIUM SERPL-MCNC: 9.1 MG/DL
CALCIUM SERPL-MCNC: 9.1 MG/DL
CHLORIDE SERPL-SCNC: 98 MMOL/L
CHOLEST SERPL-MCNC: 153 MG/DL
CO2 SERPL-SCNC: 18 MMOL/L
CREAT SERPL-MCNC: 2.34 MG/DL
EGFRCR SERPLBLD CKD-EPI 2021: 21 ML/MIN/1.73M2
GLUCOSE SERPL-MCNC: 97 MG/DL
HCT VFR BLD CALC: 40.6 %
HDLC SERPL-MCNC: 74 MG/DL
HGB BLD-MCNC: 12.4 G/DL
LDLC SERPL-MCNC: 57 MG/DL
MCHC RBC-ENTMCNC: 30.5 G/DL
MCHC RBC-ENTMCNC: 31.2 PG
MCV RBC AUTO: 102 FL
NONHDLC SERPL-MCNC: 79 MG/DL
PARATHYROID HORMONE INTACT: 135 PG/ML
PHOSPHATE SERPL-MCNC: 4.7 MG/DL
PLATELET # BLD AUTO: 296 K/UL
POTASSIUM SERPL-SCNC: 5 MMOL/L
PROT SERPL-MCNC: 6.8 G/DL
RBC # BLD: 3.98 M/UL
RBC # FLD: 13.5 %
SODIUM SERPL-SCNC: 135 MMOL/L
TRIGL SERPL-MCNC: 133 MG/DL
WBC # FLD AUTO: 10.83 K/UL

## 2025-06-19 ENCOUNTER — NON-APPOINTMENT (OUTPATIENT)
Age: 75
End: 2025-06-19

## 2025-06-20 ENCOUNTER — APPOINTMENT (OUTPATIENT)
Dept: INFECTIOUS DISEASE | Facility: CLINIC | Age: 75
End: 2025-06-20

## 2025-06-25 ENCOUNTER — APPOINTMENT (OUTPATIENT)
Dept: OBGYN | Facility: CLINIC | Age: 75
End: 2025-06-25

## 2025-06-25 ENCOUNTER — TRANSCRIPTION ENCOUNTER (OUTPATIENT)
Age: 75
End: 2025-06-25

## 2025-06-26 ENCOUNTER — APPOINTMENT (OUTPATIENT)
Dept: PAIN MANAGEMENT | Facility: CLINIC | Age: 75
End: 2025-06-26

## 2025-07-02 ENCOUNTER — APPOINTMENT (OUTPATIENT)
Dept: INFECTIOUS DISEASE | Facility: CLINIC | Age: 75
End: 2025-07-02
Payer: COMMERCIAL

## 2025-07-02 VITALS
DIASTOLIC BLOOD PRESSURE: 65 MMHG | WEIGHT: 123 LBS | SYSTOLIC BLOOD PRESSURE: 109 MMHG | TEMPERATURE: 97.2 F | HEART RATE: 83 BPM | HEIGHT: 66 IN | OXYGEN SATURATION: 95 % | BODY MASS INDEX: 19.77 KG/M2

## 2025-07-02 PROBLEM — Z23 ENCOUNTER FOR IMMUNIZATION: Status: ACTIVE | Noted: 2025-07-02 | Resolved: 2025-07-16

## 2025-07-02 PROBLEM — Z86.19: Status: ACTIVE | Noted: 2025-07-02

## 2025-07-02 PROCEDURE — 90677 PCV20 VACCINE IM: CPT

## 2025-07-02 PROCEDURE — 99215 OFFICE O/P EST HI 40 MIN: CPT | Mod: 25

## 2025-07-02 PROCEDURE — G0009: CPT

## 2025-07-02 PROCEDURE — G0010: CPT

## 2025-07-02 PROCEDURE — 90739 HEPB VACC 2/4 DOSE ADULT IM: CPT

## 2025-07-02 RX ORDER — IVERMECTIN 3 MG/1
3 TABLET ORAL DAILY
Qty: 8 | Refills: 0 | Status: ACTIVE | COMMUNITY
Start: 2025-07-02 | End: 1900-01-01

## 2025-07-08 ENCOUNTER — NON-APPOINTMENT (OUTPATIENT)
Age: 75
End: 2025-07-08

## 2025-07-20 ENCOUNTER — NON-APPOINTMENT (OUTPATIENT)
Age: 75
End: 2025-07-20

## 2025-08-04 ENCOUNTER — APPOINTMENT (OUTPATIENT)
Dept: PAIN MANAGEMENT | Facility: CLINIC | Age: 75
End: 2025-08-04
Payer: MEDICARE

## 2025-08-04 VITALS — HEIGHT: 66 IN | BODY MASS INDEX: 19.77 KG/M2 | WEIGHT: 123 LBS

## 2025-08-04 DIAGNOSIS — M79.18 MYALGIA, OTHER SITE: ICD-10-CM

## 2025-08-04 DIAGNOSIS — M47.817 SPONDYLOSIS W/OUT MYELOPATHY OR RADICULOPATHY, LUMBOSACRAL REGION: ICD-10-CM

## 2025-08-04 PROCEDURE — 99204 OFFICE O/P NEW MOD 45 MIN: CPT | Mod: 25

## 2025-08-04 PROCEDURE — J3490M: CUSTOM | Mod: NC

## 2025-08-04 PROCEDURE — 20552 NJX 1/MLT TRIGGER POINT 1/2: CPT

## 2025-08-06 ENCOUNTER — APPOINTMENT (OUTPATIENT)
Dept: INTERNAL MEDICINE | Facility: CLINIC | Age: 75
End: 2025-08-06

## 2025-08-18 ENCOUNTER — APPOINTMENT (OUTPATIENT)
Dept: OBGYN | Facility: CLINIC | Age: 75
End: 2025-08-18

## 2025-08-19 DIAGNOSIS — I10 ESSENTIAL (PRIMARY) HYPERTENSION: ICD-10-CM

## 2025-08-19 DIAGNOSIS — N18.32 CHRONIC KIDNEY DISEASE, STAGE 3B: ICD-10-CM

## 2025-08-19 DIAGNOSIS — E87.5 HYPERKALEMIA: ICD-10-CM

## 2025-08-19 DIAGNOSIS — E87.1 HYPO-OSMOLALITY AND HYPONATREMIA: ICD-10-CM

## 2025-08-19 DIAGNOSIS — R80.9 PROTEINURIA, UNSPECIFIED: ICD-10-CM

## 2025-08-20 ENCOUNTER — RX RENEWAL (OUTPATIENT)
Age: 75
End: 2025-08-20

## 2025-08-22 LAB
ALBUMIN SERPL ELPH-MCNC: 4 G/DL
ALP BLD-CCNC: 120 U/L
ALT SERPL-CCNC: 7 U/L
ANION GAP SERPL CALC-SCNC: 19 MMOL/L
AST SERPL-CCNC: 14 U/L
BILIRUB SERPL-MCNC: 0.3 MG/DL
BUN SERPL-MCNC: 32 MG/DL
CALCIUM SERPL-MCNC: 9.5 MG/DL
CHLORIDE SERPL-SCNC: 100 MMOL/L
CHOLEST SERPL-MCNC: 154 MG/DL
CO2 SERPL-SCNC: 17 MMOL/L
CREAT SERPL-MCNC: 1.71 MG/DL
EGFRCR SERPLBLD CKD-EPI 2021: 31 ML/MIN/1.73M2
GLUCOSE SERPL-MCNC: 98 MG/DL
HCT VFR BLD CALC: 43.6 %
HDLC SERPL-MCNC: 60 MG/DL
HGB BLD-MCNC: 13.6 G/DL
LDLC SERPL-MCNC: 68 MG/DL
MCHC RBC-ENTMCNC: 31.2 G/DL
MCHC RBC-ENTMCNC: 32 PG
MCV RBC AUTO: 102.6 FL
NONHDLC SERPL-MCNC: 95 MG/DL
PLATELET # BLD AUTO: 413 K/UL
POTASSIUM SERPL-SCNC: 5.5 MMOL/L
PROT SERPL-MCNC: 7.3 G/DL
RBC # BLD: 4.25 M/UL
RBC # FLD: 15 %
SODIUM SERPL-SCNC: 136 MMOL/L
TRIGL SERPL-MCNC: 158 MG/DL
WBC # FLD AUTO: 12.34 K/UL

## 2025-08-23 ENCOUNTER — APPOINTMENT (OUTPATIENT)
Dept: GASTROENTEROLOGY | Facility: CLINIC | Age: 75
End: 2025-08-23

## 2025-08-26 ENCOUNTER — APPOINTMENT (OUTPATIENT)
Dept: NEPHROLOGY | Facility: CLINIC | Age: 75
End: 2025-08-26

## 2025-09-03 ENCOUNTER — APPOINTMENT (OUTPATIENT)
Dept: INFECTIOUS DISEASE | Facility: CLINIC | Age: 75
End: 2025-09-03

## 2025-09-08 ENCOUNTER — APPOINTMENT (OUTPATIENT)
Dept: PAIN MANAGEMENT | Facility: CLINIC | Age: 75
End: 2025-09-08

## 2025-09-18 ENCOUNTER — APPOINTMENT (OUTPATIENT)
Dept: NEPHROLOGY | Facility: CLINIC | Age: 75
End: 2025-09-18
Payer: MEDICARE

## 2025-09-18 DIAGNOSIS — I10 ESSENTIAL (PRIMARY) HYPERTENSION: ICD-10-CM

## 2025-09-18 DIAGNOSIS — F32.A ANXIETY DISORDER, UNSPECIFIED: ICD-10-CM

## 2025-09-18 DIAGNOSIS — N18.32 CHRONIC KIDNEY DISEASE, STAGE 3B: ICD-10-CM

## 2025-09-18 DIAGNOSIS — F41.9 ANXIETY DISORDER, UNSPECIFIED: ICD-10-CM

## 2025-09-18 DIAGNOSIS — E87.5 HYPERKALEMIA: ICD-10-CM

## 2025-09-18 PROCEDURE — 99213 OFFICE O/P EST LOW 20 MIN: CPT | Mod: 93
